# Patient Record
Sex: FEMALE | URBAN - METROPOLITAN AREA
[De-identification: names, ages, dates, MRNs, and addresses within clinical notes are randomized per-mention and may not be internally consistent; named-entity substitution may affect disease eponyms.]

---

## 2022-06-16 ENCOUNTER — OFFICE VISIT (OUTPATIENT)
Dept: ORTHOPEDIC SURGERY | Age: 32
End: 2022-06-16
Payer: COMMERCIAL

## 2022-06-16 VITALS — BODY MASS INDEX: 18.58 KG/M2 | HEIGHT: 62 IN | WEIGHT: 101 LBS

## 2022-06-16 DIAGNOSIS — M75.01 ADHESIVE CAPSULITIS OF RIGHT SHOULDER: ICD-10-CM

## 2022-06-16 DIAGNOSIS — M25.511 RIGHT SHOULDER PAIN, UNSPECIFIED CHRONICITY: Primary | ICD-10-CM

## 2022-06-16 PROCEDURE — 99203 OFFICE O/P NEW LOW 30 MIN: CPT | Performed by: ORTHOPAEDIC SURGERY

## 2022-06-16 PROCEDURE — 20610 DRAIN/INJ JOINT/BURSA W/O US: CPT | Performed by: ORTHOPAEDIC SURGERY

## 2022-06-16 RX ORDER — MELOXICAM 15 MG/1
TABLET ORAL
COMMUNITY
Start: 2022-05-20

## 2022-06-16 RX ORDER — HYOSCYAMINE SULFATE 0.12 MG/1
TABLET SUBLINGUAL
COMMUNITY
Start: 2022-01-13 | End: 2022-11-03

## 2022-06-16 RX ORDER — BUSPIRONE HYDROCHLORIDE 15 MG/1
TABLET ORAL
COMMUNITY
Start: 2022-05-20

## 2022-06-16 RX ORDER — TRIAMCINOLONE ACETONIDE 40 MG/ML
80 INJECTION, SUSPENSION INTRA-ARTICULAR; INTRAMUSCULAR ONCE
Status: COMPLETED | OUTPATIENT
Start: 2022-06-16 | End: 2022-06-16

## 2022-06-16 RX ORDER — DICYCLOMINE HCL 20 MG
20 TABLET ORAL
COMMUNITY
Start: 2022-01-13

## 2022-06-16 RX ORDER — LIDOCAINE HYDROCHLORIDE 10 MG/ML
8 INJECTION, SOLUTION INFILTRATION; PERINEURAL ONCE
Status: COMPLETED | OUTPATIENT
Start: 2022-06-16 | End: 2022-06-16

## 2022-06-16 RX ORDER — BACLOFEN 10 MG/1
TABLET ORAL
COMMUNITY
Start: 2022-05-20

## 2022-06-16 RX ADMIN — LIDOCAINE HYDROCHLORIDE 8 ML: 10 INJECTION, SOLUTION INFILTRATION; PERINEURAL at 17:04

## 2022-06-16 RX ADMIN — TRIAMCINOLONE ACETONIDE 80 MG: 40 INJECTION, SUSPENSION INTRA-ARTICULAR; INTRAMUSCULAR at 17:04

## 2022-06-16 NOTE — PROGRESS NOTES
Chief Complaint    Shoulder Pain (NP RIGHT SHOULDER)      History of Present Illness:  Charla Griffith is a pleasant,  34 y.o. female here today on referral from Dr. Karlee Gagnon for consultation and evaluation of right shoulder pain. 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 She then had a arthroscopic Bankart repair by Dr. Amador Neal when she was 23. Reported she had done well since her arthroscopic surgery but with twinges of pain here or there. She continues to work as a , also breeds large canines. She has 4 young children. With no recent injury or trauma, she started having intense right shoulder pain with a limited range of motion starting January of this year, became very limited in May. She was treated with oral steroids taper followed by scheduled anti-inflammatory followed by physical therapy. Medical History:    No notes on file    Patient's medications, allergies, past medical, surgical, social and family histories were reviewed and updated as appropriate. History reviewed. No pertinent past medical history.    Social History     Socioeconomic History    Marital status: Legally      Spouse name: Not on file    Number of children: Not on file    Years of education: Not on file    Highest education level: Not on file   Occupational History    Not on file   Tobacco Use    Smoking status: Never Smoker    Smokeless tobacco: Never Used   Substance and Sexual Activity    Alcohol use: Never    Drug use: Never    Sexual activity: Not on file   Other Topics Concern    Not on file   Social History Narrative    Not on file     Social Determinants of Health     Financial Resource Strain:     Difficulty of Paying Living Expenses: Not on file   Food Insecurity:     Worried About Running Out of Food in the Last Year: Not on file    Aldo of Food in the Last Year: Not on file   Transportation Needs:  Lack of Transportation (Medical): Not on file    Lack of Transportation (Non-Medical): Not on file   Physical Activity:     Days of Exercise per Week: Not on file    Minutes of Exercise per Session: Not on file   Stress:     Feeling of Stress : Not on file   Social Connections:     Frequency of Communication with Friends and Family: Not on file    Frequency of Social Gatherings with Friends and Family: Not on file    Attends Jain Services: Not on file    Active Member of Clubs or Organizations: Not on file    Attends Club or Organization Meetings: Not on file    Marital Status: Not on file   Intimate Partner Violence:     Fear of Current or Ex-Partner: Not on file    Emotionally Abused: Not on file    Physically Abused: Not on file    Sexually Abused: Not on file   Housing Stability:     Unable to Pay for Housing in the Last Year: Not on file    Number of Places Lived in the Last Year: Not on file    Unstable Housing in the Last Year: Not on file       Allergies   Allergen Reactions    Azithromycin Diarrhea     Current Outpatient Medications on File Prior to Visit   Medication Sig Dispense Refill    dicyclomine (BENTYL) 20 MG tablet Take 20 mg by mouth      Hyoscyamine Sulfate SL 0.125 MG SUBL DISSOLVE 1 TAB BY MOUTH WITH MEALS AND AT NIGHT UP TO FOUR TIMES DAILY      baclofen (LIORESAL) 10 MG tablet TAKE 1 TABLET BY MOUTH TWICE A DAY      busPIRone (BUSPAR) 15 MG tablet TAKE 1 TABLET BY MOUTH TWICE A DAY      meloxicam (MOBIC) 15 MG tablet TAKE 1 TABLET BY MOUTH EVERY DAY       No current facility-administered medications on file prior to visit. Review of Systems  A 14 point review of systems was completed by the patient on 6/16/2022 and is available in the media section of the scanned medical record and was reviewed on 6/16/2022. The review is negative with the exception of those things mentioned in the HPI and Past Medical History    Vital Signs:   There were no vitals filed for this visit. General Exam:   Constitutional: Patient is adequately groomed with no evidence of malnutrition      R Shoulder Examination:    Inspection:  No skin lesions, no obvious deformity, no swelling. Palpation:  Tenderness over scapula and anterior shoulder    Active Range of Motion: Forward Elevation 80, Abduction 50, External Rotation 0, Internal Rotation 0    Passive Range of Motion: Forward Elevation 90, Abduction 90, External Rotation 5, Internal Rotation 5    Strength:  External Rotation 5/5, Internal Rotation 5/5, Champagne Toast 5/5, Supraspinatus 5/5    Special Tests: Lack of normal glenohumeral glide compared to contralateral side    Neurovascular: Palpable radial pulse, normal sensation in the median, ulnar, radial nerve distributions    Radiology:     Plain radiographs of the R shoulder, comprising 3 views: AP, Scapular Y and Axillary lateral were  obtained and reviewed in the office:    Impression: Normal osseous alignment of the glenohumeral joint         Assessment :  Sophie Cardona is a pleasant 34 y.o. female with pain related to right shoulder severe adhesive capsulitis        Impression:  Encounter Diagnoses   Name Primary?  Right shoulder pain, unspecified chronicity Yes    Adhesive capsulitis of right shoulder        Office Procedures:  Orders Placed This Encounter   Procedures    XR SHOULDER RIGHT 1 VW     Standing Status:   Future     Number of Occurrences:   1     Standing Expiration Date:   6/15/2023    External Referral To Physical Therapy     Referral Priority:   Routine     Referral Type:   Eval and Treat     Referral Reason:   Specialty Services Required     Requested Specialty:   Physical Therapist     Number of Visits Requested:   1    NM ARTHROCENTESIS ASPIR&/INJ MAJOR JT/BURSA W/O US       Treatment Plan:  Pertinent imaging was reviewed. The etiology, natural history, and treatment options for the disorder were discussed.   The roles of activity modifications, medications, cryotherapy and heat, injections, physical therapy, and surgical interventions were all described to the patient and questions were answered. Procedure: The indications and risks of steroid injection as well as treatment alternatives were discussed with the patient who consented to the procedure. Under sterile conditions and after informed consent was obtained, using posterior approach the patient was given an injection into the right shoulder glenohumeral joint. 2mL 40 mg of Kenalog  and 4 mL of 0.5% ropivacaine (Naropin) were placed in the shoulder after it was prepped with chlorhexidine. This resulted in good relief of symptoms. There were no complications. The patient was advised to ice the shoulder this evening and to avoid vigorous activities for the next 2 days. They were advised to call us if there was any erythema, enduration, swelling or increasing pain. Detailed physical therapy and instruction provided. Flores Alondraricki will start twice a week physical therapy with daily home exercises to stretch the capsule and cope with this problem. Isha Villalpando will follow up in 4-6 weeks and/or as needed. They were in agreement with this plan and all questions were answered to the patient's satisfaction. They were encouraged to call with any questions. 2:31 PM  6/16/2022    Kinsey Drummond MD  Sports Medicine Fellow  [facility]    The encounter with Isha Villalpando was supervised by Dr. Alf Teresa who personally examined the patient and reviewed the plan. This dictation was performed with a verbal recognition program (DRAGON) and it was checked for errors. It is possible that there are still dictated errors within this office note. If so, please bring any errors to my attention for an addendum.   All efforts were made to ensure that this office note is accurate.   _______________  I was physically present and personally supervised the Orthopaedic Sports Medicine Fellow in the evaluation and development of a treatment plan for this patient. I personally interviewed the patient and performed a physical examination. In addition, I discussed the patient's condition and treatment options with them. I have also reviewed and agree with the past medical, family and social history unless otherwise noted. All of the patient's questions were answered. Rancho Khan MD, PhD  6/16/2022        ______________________

## 2022-06-16 NOTE — LETTER
Shoulder Elbow Rehabilitation Referral    Patient Name: Judith Upton      YOB: 1990    Diagnosis:   1. Right shoulder pain, unspecified chronicity    adhesive capsulitis    Post Op Instructions:  [] Continuous passive motion (CPM)  [] Elbow range of motion  [] Exercise in plane of scapula   []  Strengthening     [] Pulley and instruction    [x] Home exercise program (copy to patient)   [] Sling when arm at risk  [] Sling or brace at all times   [x] AAROM: Forward elevation to 170            [x] AAROM: External rotation to 170    [] Isometric external rotator strengthening [x] AAROM: internal rotation: up the back  [] Isometric abductor strengthening  [x] AAROM: Internal abduction     [] Isometric internal rotator strengthening [] AAROM: cross-body adduction             Stretching:     Strengthening:  [x] Four quadrant (FE, ER, IR, CBA)  [] Rotator cuff (ER, IR, Abd)  [x] Forward Elevation    [] External Rotators     [x] External Rotation    [] Internal Rotators  [x] Internal Rotation: up/back   [] Abductors     [x] Internal Rotation: supine in abduction  [] Flexors  [x] Cross-body abduction    [] Extensors  [x] Pendulum (FE, Abd/Add, cw/ccw)  [x] Scapular Stabilizers   [x] Wall-walking (FE, Abd)    [x] Shoulder shrugs     [x] Table slides      [x] Rhomboid pinch  [x] Elbow (flex, ext, pron, sup)    [] Lat.  Pull downs     [] Medial epicondylitis program    [] Forward punch   [] Lateral epicondylitis program    [] Internal rotators     [] Progressive resistive exercises  [] Bench Press        [] Bench press plus  Activities:     [] Lateral pull-downs  [] Rowing     [] Progressive two-hand supine press  [] Stepper/Exercise bike   [] Biceps: curls/supination  [] Swimming  [] Water exercises    Modalities: PRN    Return to Sport:  [x] Ultrasound     [] Plyometrics  [] Iontophoresis     [] Rhythmic stabilization  [] Moist heat     [] Core strengthening   [x] Massage     [] Sports specific program: [x] Cryotherapy      [x] Electrical stimulation     [x] Paraffin  [x] Whirlpool  [] TENS    [x] Home exercise program (copy to patient). Perform exercises for:   15     minutes    2-3      times/day  [x] Supervised physical therapy  Frequency: []  1x week  [x] 2x week  [] 3x week  [] Other:   Duration: [] 2 weeks   [] 4 weeks  [x] 6 weeks  [] Other:     Additional Instructions:   Adhesive capsulitis. Gentle stretching exercises, manipulations, hands-on, use all modalities        Samer VIJI Petit MD, PhD

## 2022-07-14 ENCOUNTER — OFFICE VISIT (OUTPATIENT)
Dept: ORTHOPEDIC SURGERY | Age: 32
End: 2022-07-14
Payer: COMMERCIAL

## 2022-07-14 VITALS — WEIGHT: 101 LBS | BODY MASS INDEX: 18.58 KG/M2 | HEIGHT: 62 IN

## 2022-07-14 DIAGNOSIS — M25.511 RIGHT SHOULDER PAIN, UNSPECIFIED CHRONICITY: Primary | ICD-10-CM

## 2022-07-14 DIAGNOSIS — M67.911 ROTATOR CUFF DISORDER, RIGHT: ICD-10-CM

## 2022-07-14 PROCEDURE — 99213 OFFICE O/P EST LOW 20 MIN: CPT | Performed by: ORTHOPAEDIC SURGERY

## 2022-07-14 RX ORDER — SERTRALINE HYDROCHLORIDE 100 MG/1
100 TABLET, FILM COATED ORAL NIGHTLY
COMMUNITY
Start: 2022-07-14

## 2022-07-14 NOTE — PROGRESS NOTES
Chief Complaint    Shoulder Pain (CK Rt Shoulder)      History of Present Illness:  Gabi Jose is a pleasant, 34 y.o., female, here today for follow up of right shoulder. She reports right shoulder pain since few months ago . She does not remember any specific injury to her shoulder. She has history of right shoulder arthrosocpy and labral repair several years ago. Last visit she was diagnosed with adhesive capsultiitis and she has givin a steroid injection. She states that her shoulder motion getting better after the injection. However, she still have pain that affecting her work and daily activites. She has continued in physical therapy as instructed. She reports no new injuries or setbacks. History of Present Illness from 6/16/2022:  Gabi Jose is a pleasant,  34 y.o. female here today on referral from Dr. Crystal Wallace for consultation and evaluation of right shoulder pain. 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 She then had a arthroscopic Bankart repair by Dr. Mary Marlow when she was 23. Reported she had done well since her arthroscopic surgery but with twinges of pain here or there. She continues to work as a , also breeds large canines. She has 4 young children. With no recent injury or trauma, she started having intense right shoulder pain with a limited range of motion starting January of this year, became very limited in May. She was treated with oral steroids taper followed by scheduled anti-inflammatory followed by physical therapy. Medical History:  Patient's medications, allergies, past medical, surgical, social and family histories were reviewed and updated as appropriate. No notes on file    Review of Systems  A 14 point review of systems was completed by the patient on  and is available in the media section of the scanned medical record and was reviewed on 7/14/2022.   The review is negative with the exception of those things mentioned in the HPI and Past Medical History    Vital Signs: There were no vitals filed for this visit. General/Appearance: Alert and oriented and in no apparent distress. Skin:  There are no skin lesions, cellulitis, or extreme edema. The patient has warm and well-perfused Bilateral upper extremities with brisk capillary refill. Inspection:  No skin lesions, no obvious deformity, no swelling. Palpation:  Tenderness over rotator cuff footprint     Active Range of Motion: Forward Elevation 150, Abduction 150 External Rotation 40, Internal Rotation L1     Passive Range of Motion: Same as active     Strength:  External Rotation 5/5, Internal Rotation 5/5, Champagne Toast 4/5 with pain, Supraspinatus /5     Special Tests:  Positive impingement signs     Neurovascular: Palpable radial pulse, normal sensation in the median, ulnar, radial nerve distributions      Radiology:     No new XR obtained at this time. Assessment :  Ms. Humera Sousa is a pleasant, 34 y.o. patient who is recovering from adhesive capsulitis and she has rotator cuff tenderness and weakness. We will obtain an MRI to rule out a rotator cuff tear      Impression:  Encounter Diagnoses   Name Primary? Right shoulder pain, unspecified chronicity Yes    Rotator cuff disorder, right        Office Procedures:  Orders Placed This Encounter   Procedures    MRI SHOULDER RIGHT WO CONTRAST     Standing Status:   Future     Standing Expiration Date:   7/14/2023     Order Specific Question:   Reason for exam:     Answer:   Moncho Weaver       Treatment Plan: Patient has a previous diagnosis of adhesive capsulitis on her last visit and she had a steroid injection and physiotherapy. She presented today to our clinic with significant improvement in her shoulder range of motion.   However, she still have shoulder pain and her clinical examination shows positive impingement signs and rotator cuff muscle weakness. We do think she may have an underlying rotator cuff tear that cause for her secondary adhesive capsulitis. We would like to have an MRI on her shoulder to rule out rotator cuff tear or any other internal derangement of her shoulder. Would like to see her back after the MRI. All questions were answered to patient's satisfaction and She was encouraged to call with any further questions or concerns. Morgan Cheung is in agreement with this plan. 7/14/2022  11:18 AM    Danny Vasquez MD  Clinical Fellow at [place] [address]    During this examination, Dameon Garay MD functioned as a scribe for Dr. Margarita Rodrigues    This dictation was performed with a verbal recognition program (DRAGON) and it was checked for errors. It is possible that there are still dictated errors within this office note. If so, please bring any errors to my attention for an addendum. All efforts were made to ensure that this office note is accurate.  ______________  I was physically present and personally supervised the Orthopaedic Sports Medicine Fellow in the evaluation and development of a treatment plan for this patient. I personally interviewed the patient and performed a physical examination. In addition, I discussed the patient's condition and treatment options with them. I have also reviewed and agree with the past medical, family and social history unless otherwise noted. All of the patient's questions were answered. Rancho Ivory MD, PhD  7/14/2022

## 2022-08-18 ENCOUNTER — OFFICE VISIT (OUTPATIENT)
Dept: ORTHOPEDIC SURGERY | Age: 32
End: 2022-08-18
Payer: COMMERCIAL

## 2022-08-18 DIAGNOSIS — M25.511 RIGHT SHOULDER PAIN, UNSPECIFIED CHRONICITY: Primary | ICD-10-CM

## 2022-08-18 DIAGNOSIS — M75.21 BICEPS TENDINITIS ON RIGHT: ICD-10-CM

## 2022-08-18 DIAGNOSIS — M67.911 ROTATOR CUFF DISORDER, RIGHT: Primary | ICD-10-CM

## 2022-08-18 PROCEDURE — L3670 SO ACRO/CLAV CAN WEB PRE OTS: HCPCS | Performed by: ORTHOPAEDIC SURGERY

## 2022-08-18 PROCEDURE — 99213 OFFICE O/P EST LOW 20 MIN: CPT | Performed by: ORTHOPAEDIC SURGERY

## 2022-08-18 NOTE — PROGRESS NOTES
Chief Complaint    Shoulder Pain (Test results - rt shoulder)      History of Present Illness:  Hilda Flowers is a pleasant, 34 y.o., female, here today for follow up of her right shoulder and imaging results. We have been treating this pain conservatively for pain related to adhesive caspulitis. We did administer a steroid injection for this problem. Her motion did improve with conservative treatment, however she continues to experience  persistent pain in the shoulder. This greatly impacts her quality of life. She is having difficulty lifting her toddler in and out of the car seat. She reports no new injuries or setbacks. Pain Assessment  Location of Pain: Shoulder  Location Modifiers: Right  Severity of Pain: 7  Quality of Pain: Dull, Aching  Duration of Pain: Other (Comment)  Frequency of Pain: Intermittent      Medical History:  Patient's medications, allergies, past medical, surgical, social and family histories were reviewed and updated as appropriate. Review of Systems  A 14 point review of systems was completed by the patient and is available in the media section of the scanned medical record and was reviewed on 8/18/2022. The review is negative with the exception of those things mentioned in the HPI and Past Medical History    Vital Signs: There were no vitals filed for this visit. General/Appearance: Alert and oriented and in no apparent distress. Skin:  There are no skin lesions, cellulitis, or extreme edema. The patient has warm and well-perfused Bilateral upper extremities with brisk capillary refill. Right Shoulder Exam:  Inspection: No gross deformities, no signs of infection. Palpation: Tenderness over bicipital groove    Active Range of Motion:   Forward Elevation 150, Abduction 150, External Rotation 40, Internal Rotation L1    Passive Range of Motion: Cross arm adduction elicits anterior shoulder pain    Strength:  External Rotation 5/5, Internal Rotation 5/5    Special Tests: No Chava muscle deformity. Neurovascular: Sensation to light touch is intact, no motor deficits, palpable radial pulses 2+      Radiology:     No new XR obtained at this time. MRI Right Shoulder: 8/8/22    CONCLUSION:   1. Mild tendinosis and low-grade humeral and articular sided fraying in the distal superior and    mid subscapularis tendon fibers at the lesser tuberosity footprint. 2. Intact long head biceps tendon. 3. Intact labrum. 4. Preserved glenohumeral cartilage. 5. No acromioclavicular joint hypertrophy. Assessment :  Ms. Svitlana Smyth is a pleasant, 34 y.o. patient with a right shoulder, partial cuff tear as well as clinically she has biceps instability. However, this was not clearly defined on the MRI. Impression:  Encounter Diagnosis   Name Primary? Rotator cuff disorder, right Yes       Office Procedures:  No orders of the defined types were placed in this encounter. Treatment Plan: We reviewed pertinent imaging today with Svitlana Smyth. We discussed diagnosis and treatment options in detail. We discussed conservative vs surgical treatment options moving forward. Surgically we could proceed with a diagnostic scope at which time the biceps tendon would likely be re-attached. Conservatively may proceed with a repeat steroid injection, oral NSAIDs and activity modification coupled with continued formal physical therapy. After discussing these options, she would like to go ahead and proceed with an operation. We discussed in detail risks, benefits and expectations postoperatively. We also discussed a recovery timeline following this operation. We will provide surgical dates for the patient today and she may schedule this at her convenience. We will go ahead and have her meet with DME to obtain a sling to wear postoperatively. We will see [facility] for surgery and/or as needed.  All questions were answered to patient's satisfaction and She was encouraged to call with any further questions or concerns. Olaf Saleh is in agreement with this plan. Risks, benefits and potential complications of arthroscopic shoulder surgery were discussed with the patient. Risks discussed include but are not limited to bleeding, infection, anesthetic risk, injury to nerves and blood vessels, deep vein thrombosis, residual stiffness and weakness, and the need for revision surgery. The patient also understands that anesthetic risks include cardiopulmonary issues, drug reactions and even death. The patient voices an understanding of the importance of physical therapy and home exercises after surgery. All questions were answered and written informed consent for surgery was obtained today. The patient was counseled at length about the risks of micki Covid-19 during their perioperative period and any recovery window from their procedure. The patient was made aware that micki Covid-19  may worsen their prognosis for recovering from their procedure  and lend to a higher morbidity and/or mortality risk. All material risks, benefits, and reasonable alternatives including postponing the procedure were discussed. The patient does wish to proceed with the procedure at this time. 8/18/2022  4:24 PM    Rebecca Frost ATC  [facility] [address] [facility]    During this examination, I, Clair Bamberger, functioned as a scribe for Dr. Stacey Mantilla. The history taking and physical examination were performed by Dr. Lyubov Hanson. All counseling during the appointment was performed between the patient and Dr. Lyubov Hanson. 8/18/22  ______________  I, Dr. Stacey Mantilla, personally performed the services described in this documentation as described by Rebecca Frost ATC in my presence, and it is both accurate and complete. Rancho Hanson MD, PhD  8/18/2022

## 2022-09-26 ENCOUNTER — TELEPHONE (OUTPATIENT)
Dept: ORTHOPEDIC SURGERY | Age: 32
End: 2022-09-26

## 2022-09-26 NOTE — TELEPHONE ENCOUNTER
General Question     Subject: FITTED FOR A SLING  Patient and /or Facility Request: Colby Salomon  Contact Number: [phone]    PATIENT CALLED IN TO SEE IF SHE CAN GET FITTED FOR A SLING. Paulette Mcdowell PATIENT IS HAVING SX ON NOV 7. 2022. . AND WOULD LIKE TO COME IN BEFORE HER SX FOR CAN ORDER ONE. .. PLEASE CALL PATIENT BACK AT THE ABOVE NUMBER. ..

## 2022-10-14 ENCOUNTER — TELEPHONE (OUTPATIENT)
Dept: ORTHOPEDIC SURGERY | Age: 32
End: 2022-10-14

## 2022-10-14 NOTE — TELEPHONE ENCOUNTER
LM for the patient regarding routing the completed paperwork to [facility] office for .

## 2022-10-14 NOTE — TELEPHONE ENCOUNTER
Completed both the [facility] form and the FMLA form. Need to contact the patient regarding a fax # for FMLA or to see if she would like to  at one of our locations.

## 2022-10-19 ENCOUNTER — TELEPHONE (OUTPATIENT)
Dept: ORTHOPEDIC SURGERY | Age: 32
End: 2022-10-19

## 2022-10-25 NOTE — TELEPHONE ENCOUNTER
Tali Aarti [id number]    Date: 11/07/22  Type of SX:  Outpatient  Location: [facility]  CPT: 44787   DX Code: I30.005, M75.21  SX area: Rt shoulder  Insurance: [employer]  CPT: 16172  DX: M67.911, M75.21  SX area: Rt shoulder

## 2022-11-03 RX ORDER — VALACYCLOVIR HYDROCHLORIDE 500 MG/1
TABLET, FILM COATED ORAL
COMMUNITY
Start: 2022-10-06 | End: 2022-11-29

## 2022-11-03 NOTE — PROGRESS NOTES
[facility] PRE-SURGICAL TESTING INSTRUCTIONS                      PRIOR TO PROCEDURE DATE:    1. PLEASE FOLLOW ANY INSTRUCTIONS GIVEN TO YOU PER YOUR SURGEON. 2. Arrange for someone to drive you home and be with you for the first 24 hours after discharge for your safety after your procedure for which you received sedation. Ensure it is someone we can share information with regarding your discharge. NOTE: At this time ONLY 2 ADULTS may accompany you   One person MUST stay at hospital entire time if outpatient surgery      3. You must contact your surgeon for instructions IF:  You are taking any blood thinners, aspirin, anti-inflammatory or vitamins. There is a change in your physical condition such as a cold, fever, rash, cuts, sores, or any other infection, especially near your surgical site. 4. Do not drink alcohol the day before or day of your procedure. Do not use any recreational marijuana at least 24 hours or street drugs (heroin, cocaine) at minimum 5 days prior to your procedure. 5. A Pre-Surgical History and Physical MUST be completed WITHIN 30 DAYS OR LESS prior to your procedure. by your Physician or an Urgent Care        THE DAY OF YOUR PROCEDURE:  1. Follow instructions for ARRIVAL TIME as DIRECTED BY YOUR SURGEON. 2. Enter the MAIN entrance from [address] and follow the signs to the free Parking [employer] or [employer] (offered free of charge 7 am-5pm). 3. Enter the Main Entrance of the hospital (do not enter from the lower level of the parking garage). Upon entrance, check in with the  at the surgical information desk on your LEFT. Bring your insurance card and photo ID to register      4. DO NOT EAT ANYTHING 8 hours prior to arrival for surgery. You may have up to 8 ounces of water 4 hours prior to your arrival for surgery.    NOTE: ALL Gastric, Bariatric & Bowel surgery patients - you MUST follow your surgeon's instructions regarding eating/ drinking as you will have very specific instructions to follow. If you did not receive these, call your surgeon's office immediately. 5. MEDICATIONS:  Take the following medications with a SMALL sip of water: NONE  Use your usual dose of inhalers the morning of surgery. BRING your rescue inhaler with you to hospital.   Anesthesia does NOT want you to take insulin the morning of surgery. They will control your blood sugar while you are at the hospital. Please contact your ordering physician for instructions regarding your insulin the night before your procedure. If you have an insulin pump, please keep it set on basal rate. Bariatric patient's call your surgeon if on diabetic medications as some may need to be stopped 1 week prior to surgery    6. Do not swallow additional water when brushing teeth. No gum, candy, mints, or ice chips. Refrain from smoking or at least decrease the amount on day of surgery. 7. Morning of surgery:   Take a shower with an antibacterial soap (i.e., Safeguard or Dial) OR your physician may have instructed you to use Hibiclens. Dress in loose, comfortable clothing appropriate for redressing after your procedure. Do not wear jewelry (including body piercings), make-up (especially NO eye make-up), fingernail polish (NO toenail polish if foot/leg surgery), lotion, powders, or metal hairclips. Do not shave or wax for 72 hours prior to procedure near your operative site. Shaving with a razor can irritate your skin and make it easier to develop an infection. On the day of your procedure, any hair that needs to be removed near the surgical site will be 'clipped' by a healthcare worker using a special clipper designed to avoid skin irritation. 8. Dentures, glasses, or contacts will need to be removed before your procedure. Bring cases for your glasses, contacts, dentures, or hearing aids to protect them while you are in surgery.       9. If you use a CPAP, please bring it with you on the day of your procedure. 10. We recommend that valuable personal belongings such as cash, cell phones, e-tablets, or jewelry, be left at home during your stay. The hospital will not be responsible for valuables that are not secured in the hospital safe. However, if your insurance requires a co-pay, you may want to bring a method of payment, i.e., Check or credit card, if you wish to pay your co-pay the day of surgery. 11. If you are to stay overnight, you may bring a bag with personal items. Please have any large items you may need brought in by your family after your arrival to your hospital room. 12. If you have a Living Will or Durable Power of , please bring a copy on the day of your procedure. How we keep you safe and work to prevent surgical site infections:   1. Health care workers should always check your ID bracelet to verify your name and birth date. You will be asked many times to state your name, date of birth, and allergies. 2. Health care workers should always clean their hands with soap or alcohol gel before providing care to you. It is okay to ask anyone if they cleaned their hands before they touch you. 3. You will be actively involved in verifying the type of procedure you are having and ensuring the correct surgical site. This will be confirmed multiple times prior to your procedure. Do NOT zach your surgery site UNLESS instructed to by your surgeon. 4. When you are in the operating room, your surgical site will be cleansed with a special soap, and in most cases, you will be given an antibiotic before the surgery begins. What to expect AFTER your procedure? 1. Immediately following your procedure, your will be taken to the PACU for the first phase of your recovery. Your nurse will help you recover from any potential side effects of anesthesia, such as extreme drowsiness, changes in your vital signs or breathing patterns.  Nausea, headache, muscle aches, or sore throat may also occur after anesthesia. Your nurse will help you manage these potential side effects. 2. For comfort and safety, arrange to have someone at home with you for the first 24 hours after discharge. 3. You and your family will be given written instructions about your diet, activity, dressing care, medications, and return visits. 4. Once at home, should issues with nausea, pain, or bleeding occur, or should you notice any signs of infection, you should call your surgeon. 5. Always clean your hands before and after caring for your wound. Do not let your family touch your surgery site without cleaning their hands. 6. Narcotic pain medications can cause significant constipation. You may want to add a stool softener to your postoperative medication schedule or speak to your surgeon on how best to manage this SIDE EFFECT. Thank you for allowing us to care for you. We strive to exceed your expectations in the delivery of care and service provided to you and your family. If you need to contact the [place] [address] staff for any reason, please call us at [phone]    Instructions reviewed with patient during preadmission testing phone interview.   Avril Bernal RN.11/3/2022 .2:38 PM      ADDITIONAL EDUCATIONAL INFORMATION REVIEWED PER PHONE WITH YOU AND/OR YOUR FAMILY:  No Hibiclens® Bathing Instructions   Yes Antibacterial Soap

## 2022-11-03 NOTE — PROGRESS NOTES
Place patient label inside box (if no patient label, complete below)  Name:  :  MR#:   Jose Chance / PROCEDURE  I (we) Leonardo Sepulveda (Patient Name) authorize DR. Cullen Loyd (Provider / Aliza Serve) and/or such assistants as may be selected by him/her, to perform the following operation/procedure(s): RIGHT SHOULDER ARTHROSCOPIC BICEPS TENODESIS       Note: If unable to obtain consent prior to an emergent procedure, document the emergent reason in the medical record. This procedure has been explained to my (our) satisfaction and included in the explanation was: The intended benefit, nature, and extent of the procedure to be performed; The significant risks involved and the probability of success; Alternative procedures and methods of treatment; The dangers and probable consequences of such alternatives (including no procedure or treatment); The expected consequences of the procedure on my future health; Whether other qualified individuals would be performing important surgical tasks and/or whether  would be present to advise or support the procedure. I (we) understand that there are other risks of infection and other serious complications in the pre-operative/procedural and postoperative/procedural stages of my (our) care. I (we) have asked all of the questions which I (we) thought were important in deciding whether or not to undergo treatment or diagnosis. These questions have been answered to my (our) satisfaction. I (we) understand that no assurance can be given that the procedure will be a success, and no guarantee or warranty of success has been given to me (us). It has been explained to me (us) that during the course of the operation/procedure, unforeseen conditions may be revealed that necessitate extension of the original procedure(s) or different procedure(s) than those set forth in Paragraph 1.  I (we) authorize and request that the above-named physician, his/her assistants or his/her designees, perform procedures as necessary and desirable if deemed to be in my (our) best interest.     Revised 8/2/2021                                                                          Page 1 of 2       I acknowledge that health care personnel may be observing this procedure for the purpose of medical education or other specified purposes as may be necessary as requested and/or approved by my (our) physician. I (we) consent to the disposal by the hospital Pathologist of the removed tissue, parts or organs in accordance with hospital policy. I do ____ do not ____ consent to the use of a local infiltration pain blocking agent that will be used by my provider/surgical provider to help alleviate pain during my procedure. I do ____ do not ____ consent to an emergent blood transfusion in the case of a life-threatening situation that requires blood components to be administered. This consent is valid for 24 hours from the beginning of the procedure. This patient does ____ or does not ____ currently have a DNR status/order. If DNR order is in place, obtain Addendum to the Surgical Consent for ALL Patients with a DNR Order to address tawnya-operative status for limited intervention or DNR suspension.      I have read and fully understand the above Consent for Operation/Procedure and that all blanks were completed before I signed the consent.   _____________________________       _____________________      ____/____am/pm  Signature of Patient or legal representative      Printed Name / Relationship            Date / Time   ____________________________       _____________________      ____/____am/pm  Witness to Signature                                    Printed Name                    Date / Time    If patient is unable to sign or is a minor, complete the following)  Patient is a minor, ____ years of age, or unable to sign because: ______________________________________________________________________________________________    If a phone consent is obtained, consent will be documented by using two health care professionals, each affirming that the consenting party has no questions and gives consent for the procedure discussed with the physician/provider.   _____________________          ____________________       _____/_____am/pm   2nd witness to phone consent        Printed name           Date / Time    Informed Consent:  I have provided the explanation described above in section 1 to the patient and/or legal representative.  I have provided the patient and/or legal representative with an opportunity to ask any questions about the proposed operation/procedure.   ___________________________          ____________________         ____/____am/pm  Provider / Proceduralist                            Printed name            Date / Time  Revised 8/2/2021                                                                      Page 2 of 2

## 2022-11-07 ENCOUNTER — ANESTHESIA EVENT (OUTPATIENT)
Dept: OPERATING ROOM | Age: 32
End: 2022-11-07
Payer: COMMERCIAL

## 2022-11-07 ENCOUNTER — HOSPITAL ENCOUNTER (OUTPATIENT)
Age: 32
Setting detail: OUTPATIENT SURGERY
Discharge: HOME | End: 2022-11-07
Attending: ORTHOPAEDIC SURGERY | Admitting: ORTHOPAEDIC SURGERY
Payer: COMMERCIAL

## 2022-11-07 ENCOUNTER — ANESTHESIA (OUTPATIENT)
Dept: OPERATING ROOM | Age: 32
End: 2022-11-07
Payer: COMMERCIAL

## 2022-11-07 VITALS
WEIGHT: 100 LBS | HEART RATE: 60 BPM | RESPIRATION RATE: 17 BRPM | HEIGHT: 62 IN | OXYGEN SATURATION: 100 % | TEMPERATURE: 98.9 F | DIASTOLIC BLOOD PRESSURE: 82 MMHG | SYSTOLIC BLOOD PRESSURE: 118 MMHG | BODY MASS INDEX: 18.4 KG/M2

## 2022-11-07 DIAGNOSIS — M67.813 BICEPS TENDINOSIS OF RIGHT SHOULDER: Primary | ICD-10-CM

## 2022-11-07 LAB — PREGNANCY, URINE: NEGATIVE

## 2022-11-07 PROCEDURE — 6360000002 HC RX W HCPCS: Performed by: ANESTHESIOLOGY

## 2022-11-07 PROCEDURE — 2580000003 HC RX 258: Performed by: ORTHOPAEDIC SURGERY

## 2022-11-07 PROCEDURE — 3600000014 HC SURGERY LEVEL 4 ADDTL 15MIN: Performed by: ORTHOPAEDIC SURGERY

## 2022-11-07 PROCEDURE — 6360000002 HC RX W HCPCS: Performed by: ORTHOPAEDIC SURGERY

## 2022-11-07 PROCEDURE — 64415 NJX AA&/STRD BRCH PLXS IMG: CPT | Performed by: ANESTHESIOLOGY

## 2022-11-07 PROCEDURE — 2500000003 HC RX 250 WO HCPCS: Performed by: NURSE ANESTHETIST, CERTIFIED REGISTERED

## 2022-11-07 PROCEDURE — C9290 INJ, BUPIVACAINE LIPOSOME: HCPCS | Performed by: ANESTHESIOLOGY

## 2022-11-07 PROCEDURE — 3700000000 HC ANESTHESIA ATTENDED CARE: Performed by: ORTHOPAEDIC SURGERY

## 2022-11-07 PROCEDURE — 6360000002 HC RX W HCPCS

## 2022-11-07 PROCEDURE — 84703 CHORIONIC GONADOTROPIN ASSAY: CPT

## 2022-11-07 PROCEDURE — 7100000001 HC PACU RECOVERY - ADDTL 15 MIN: Performed by: ORTHOPAEDIC SURGERY

## 2022-11-07 PROCEDURE — 7100000010 HC PHASE II RECOVERY - FIRST 15 MIN: Performed by: ORTHOPAEDIC SURGERY

## 2022-11-07 PROCEDURE — 7100000000 HC PACU RECOVERY - FIRST 15 MIN: Performed by: ORTHOPAEDIC SURGERY

## 2022-11-07 PROCEDURE — 2580000003 HC RX 258: Performed by: ANESTHESIOLOGY

## 2022-11-07 PROCEDURE — 2500000003 HC RX 250 WO HCPCS

## 2022-11-07 PROCEDURE — 7100000011 HC PHASE II RECOVERY - ADDTL 15 MIN: Performed by: ORTHOPAEDIC SURGERY

## 2022-11-07 PROCEDURE — 2709999900 HC NON-CHARGEABLE SUPPLY: Performed by: ORTHOPAEDIC SURGERY

## 2022-11-07 PROCEDURE — 2500000003 HC RX 250 WO HCPCS: Performed by: ANESTHESIOLOGY

## 2022-11-07 PROCEDURE — C1713 ANCHOR/SCREW BN/BN,TIS/BN: HCPCS | Performed by: ORTHOPAEDIC SURGERY

## 2022-11-07 PROCEDURE — 2720000010 HC SURG SUPPLY STERILE: Performed by: ORTHOPAEDIC SURGERY

## 2022-11-07 PROCEDURE — 3700000001 HC ADD 15 MINUTES (ANESTHESIA): Performed by: ORTHOPAEDIC SURGERY

## 2022-11-07 PROCEDURE — 3600000004 HC SURGERY LEVEL 4 BASE: Performed by: ORTHOPAEDIC SURGERY

## 2022-11-07 DEVICE — SCREW INTRF L15MM DIA4.75MM W/ SZ 2 FIBERWIRE SUT AND DISP: Type: IMPLANTABLE DEVICE | Site: SHOULDER | Status: FUNCTIONAL

## 2022-11-07 DEVICE — ANCHOR SUT L12MM DIA2.4MM BIOCOMPOSITE W/ NO2 FIBERWIRE: Type: IMPLANTABLE DEVICE | Site: SHOULDER | Status: FUNCTIONAL

## 2022-11-07 RX ORDER — OXYCODONE HYDROCHLORIDE 5 MG/1
10 TABLET ORAL PRN
Status: DISCONTINUED | OUTPATIENT
Start: 2022-11-07 | End: 2022-11-07 | Stop reason: HOSPADM

## 2022-11-07 RX ORDER — SODIUM CHLORIDE 0.9 % (FLUSH) 0.9 %
5-40 SYRINGE (ML) INJECTION PRN
Status: DISCONTINUED | OUTPATIENT
Start: 2022-11-07 | End: 2022-11-07 | Stop reason: HOSPADM

## 2022-11-07 RX ORDER — ROCURONIUM BROMIDE 10 MG/ML
INJECTION, SOLUTION INTRAVENOUS PRN
Status: DISCONTINUED | OUTPATIENT
Start: 2022-11-07 | End: 2022-11-07 | Stop reason: SDUPTHER

## 2022-11-07 RX ORDER — LABETALOL HYDROCHLORIDE 5 MG/ML
10 INJECTION, SOLUTION INTRAVENOUS
Status: DISCONTINUED | OUTPATIENT
Start: 2022-11-07 | End: 2022-11-07 | Stop reason: HOSPADM

## 2022-11-07 RX ORDER — ONDANSETRON 2 MG/ML
4 INJECTION INTRAMUSCULAR; INTRAVENOUS
Status: DISCONTINUED | OUTPATIENT
Start: 2022-11-07 | End: 2022-11-07 | Stop reason: HOSPADM

## 2022-11-07 RX ORDER — FENTANYL CITRATE 50 UG/ML
INJECTION, SOLUTION INTRAMUSCULAR; INTRAVENOUS PRN
Status: DISCONTINUED | OUTPATIENT
Start: 2022-11-07 | End: 2022-11-07 | Stop reason: SDUPTHER

## 2022-11-07 RX ORDER — GLYCOPYRROLATE 0.2 MG/ML
INJECTION INTRAMUSCULAR; INTRAVENOUS PRN
Status: DISCONTINUED | OUTPATIENT
Start: 2022-11-07 | End: 2022-11-07 | Stop reason: SDUPTHER

## 2022-11-07 RX ORDER — KETAMINE HCL IN NACL, ISO-OSM 20 MG/2 ML
SYRINGE (ML) INJECTION PRN
Status: DISCONTINUED | OUTPATIENT
Start: 2022-11-07 | End: 2022-11-07 | Stop reason: SDUPTHER

## 2022-11-07 RX ORDER — OXYCODONE HYDROCHLORIDE AND ACETAMINOPHEN 5; 325 MG/1; MG/1
1 TABLET ORAL EVERY 6 HOURS PRN
Qty: 28 TABLET | Refills: 0 | Status: SHIPPED | OUTPATIENT
Start: 2022-11-07 | End: 2022-11-14

## 2022-11-07 RX ORDER — OXYCODONE HYDROCHLORIDE 5 MG/1
5 TABLET ORAL PRN
Status: DISCONTINUED | OUTPATIENT
Start: 2022-11-07 | End: 2022-11-07 | Stop reason: HOSPADM

## 2022-11-07 RX ORDER — LIDOCAINE HYDROCHLORIDE 10 MG/ML
1 INJECTION, SOLUTION EPIDURAL; INFILTRATION; INTRACAUDAL; PERINEURAL
Status: DISCONTINUED | OUTPATIENT
Start: 2022-11-07 | End: 2022-11-07 | Stop reason: HOSPADM

## 2022-11-07 RX ORDER — SODIUM CHLORIDE 9 MG/ML
INJECTION, SOLUTION INTRAVENOUS PRN
Status: DISCONTINUED | OUTPATIENT
Start: 2022-11-07 | End: 2022-11-07 | Stop reason: HOSPADM

## 2022-11-07 RX ORDER — MIDAZOLAM HYDROCHLORIDE 1 MG/ML
INJECTION INTRAMUSCULAR; INTRAVENOUS
Status: COMPLETED
Start: 2022-11-07 | End: 2022-11-07

## 2022-11-07 RX ORDER — DEXAMETHASONE SODIUM PHOSPHATE 4 MG/ML
INJECTION, SOLUTION INTRA-ARTICULAR; INTRALESIONAL; INTRAMUSCULAR; INTRAVENOUS; SOFT TISSUE PRN
Status: DISCONTINUED | OUTPATIENT
Start: 2022-11-07 | End: 2022-11-07 | Stop reason: SDUPTHER

## 2022-11-07 RX ORDER — HYDRALAZINE HYDROCHLORIDE 20 MG/ML
10 INJECTION INTRAMUSCULAR; INTRAVENOUS
Status: DISCONTINUED | OUTPATIENT
Start: 2022-11-07 | End: 2022-11-07 | Stop reason: HOSPADM

## 2022-11-07 RX ORDER — FENTANYL CITRATE 50 UG/ML
25 INJECTION, SOLUTION INTRAMUSCULAR; INTRAVENOUS EVERY 5 MIN PRN
Status: DISCONTINUED | OUTPATIENT
Start: 2022-11-07 | End: 2022-11-07 | Stop reason: HOSPADM

## 2022-11-07 RX ORDER — ONDANSETRON 2 MG/ML
INJECTION INTRAMUSCULAR; INTRAVENOUS PRN
Status: DISCONTINUED | OUTPATIENT
Start: 2022-11-07 | End: 2022-11-07 | Stop reason: SDUPTHER

## 2022-11-07 RX ORDER — MIDAZOLAM HYDROCHLORIDE 1 MG/ML
INJECTION INTRAMUSCULAR; INTRAVENOUS PRN
Status: DISCONTINUED | OUTPATIENT
Start: 2022-11-07 | End: 2022-11-07 | Stop reason: SDUPTHER

## 2022-11-07 RX ORDER — BUPIVACAINE HYDROCHLORIDE 2.5 MG/ML
INJECTION, SOLUTION INFILTRATION; PERINEURAL
Status: DISCONTINUED | OUTPATIENT
Start: 2022-11-07 | End: 2022-11-07

## 2022-11-07 RX ORDER — SODIUM CHLORIDE, SODIUM LACTATE, POTASSIUM CHLORIDE, CALCIUM CHLORIDE 600; 310; 30; 20 MG/100ML; MG/100ML; MG/100ML; MG/100ML
INJECTION, SOLUTION INTRAVENOUS CONTINUOUS
Status: DISCONTINUED | OUTPATIENT
Start: 2022-11-07 | End: 2022-11-07 | Stop reason: HOSPADM

## 2022-11-07 RX ORDER — PROPOFOL 10 MG/ML
INJECTION, EMULSION INTRAVENOUS PRN
Status: DISCONTINUED | OUTPATIENT
Start: 2022-11-07 | End: 2022-11-07 | Stop reason: SDUPTHER

## 2022-11-07 RX ORDER — BUPIVACAINE HYDROCHLORIDE 5 MG/ML
INJECTION, SOLUTION EPIDURAL; INTRACAUDAL
Status: COMPLETED
Start: 2022-11-07 | End: 2022-11-07

## 2022-11-07 RX ORDER — FENTANYL CITRATE 50 UG/ML
INJECTION, SOLUTION INTRAMUSCULAR; INTRAVENOUS
Status: COMPLETED
Start: 2022-11-07 | End: 2022-11-07

## 2022-11-07 RX ORDER — SENNA PLUS 8.6 MG/1
1 TABLET ORAL 2 TIMES DAILY
Qty: 60 TABLET | Refills: 11 | Status: SHIPPED | OUTPATIENT
Start: 2022-11-07 | End: 2022-11-29

## 2022-11-07 RX ORDER — SODIUM CHLORIDE 0.9 % (FLUSH) 0.9 %
5-40 SYRINGE (ML) INJECTION EVERY 12 HOURS SCHEDULED
Status: DISCONTINUED | OUTPATIENT
Start: 2022-11-07 | End: 2022-11-07 | Stop reason: HOSPADM

## 2022-11-07 RX ORDER — LIDOCAINE HYDROCHLORIDE 20 MG/ML
INJECTION, SOLUTION INTRAVENOUS PRN
Status: DISCONTINUED | OUTPATIENT
Start: 2022-11-07 | End: 2022-11-07 | Stop reason: SDUPTHER

## 2022-11-07 RX ORDER — ONDANSETRON 4 MG/1
4 TABLET, FILM COATED ORAL DAILY PRN
Qty: 30 TABLET | Refills: 0 | Status: SHIPPED | OUTPATIENT
Start: 2022-11-07 | End: 2022-11-29

## 2022-11-07 RX ORDER — BUPIVACAINE HYDROCHLORIDE 5 MG/ML
INJECTION, SOLUTION EPIDURAL; INTRACAUDAL PRN
Status: DISCONTINUED | OUTPATIENT
Start: 2022-11-07 | End: 2022-11-07 | Stop reason: SDUPTHER

## 2022-11-07 RX ADMIN — FENTANYL CITRATE 100 MCG: 50 INJECTION, SOLUTION INTRAMUSCULAR; INTRAVENOUS at 12:30

## 2022-11-07 RX ADMIN — MIDAZOLAM HYDROCHLORIDE 2 MG: 2 INJECTION, SOLUTION INTRAMUSCULAR; INTRAVENOUS at 12:30

## 2022-11-07 RX ADMIN — LIDOCAINE HYDROCHLORIDE 100 MG: 20 INJECTION, SOLUTION INTRAVENOUS at 13:24

## 2022-11-07 RX ADMIN — ONDANSETRON 4 MG: 2 INJECTION INTRAMUSCULAR; INTRAVENOUS at 13:15

## 2022-11-07 RX ADMIN — SODIUM CHLORIDE, POTASSIUM CHLORIDE, SODIUM LACTATE AND CALCIUM CHLORIDE: 600; 310; 30; 20 INJECTION, SOLUTION INTRAVENOUS at 11:05

## 2022-11-07 RX ADMIN — DEXAMETHASONE SODIUM PHOSPHATE 4 MG: 4 INJECTION, SOLUTION INTRAMUSCULAR; INTRAVENOUS at 13:15

## 2022-11-07 RX ADMIN — PHENYLEPHRINE HYDROCHLORIDE 100 MCG: 10 INJECTION, SOLUTION INTRAMUSCULAR; INTRAVENOUS; SUBCUTANEOUS at 14:55

## 2022-11-07 RX ADMIN — SUGAMMADEX 100 MG: 100 INJECTION, SOLUTION INTRAVENOUS at 15:28

## 2022-11-07 RX ADMIN — BUPIVACAINE HYDROCHLORIDE 20 ML: 5 INJECTION, SOLUTION EPIDURAL; INTRACAUDAL; PERINEURAL at 12:30

## 2022-11-07 RX ADMIN — ROCURONIUM BROMIDE 10 MG: 10 INJECTION INTRAVENOUS at 14:24

## 2022-11-07 RX ADMIN — PROPOFOL 120 MG: 10 INJECTION, EMULSION INTRAVENOUS at 13:24

## 2022-11-07 RX ADMIN — ROCURONIUM BROMIDE 40 MG: 10 INJECTION INTRAVENOUS at 13:24

## 2022-11-07 RX ADMIN — CEFAZOLIN 2000 MG: 2 INJECTION, POWDER, FOR SOLUTION INTRAMUSCULAR; INTRAVENOUS at 13:29

## 2022-11-07 RX ADMIN — SODIUM CHLORIDE, POTASSIUM CHLORIDE, SODIUM LACTATE AND CALCIUM CHLORIDE: 600; 310; 30; 20 INJECTION, SOLUTION INTRAVENOUS at 14:56

## 2022-11-07 RX ADMIN — PHENYLEPHRINE HYDROCHLORIDE 50 MCG: 10 INJECTION, SOLUTION INTRAMUSCULAR; INTRAVENOUS; SUBCUTANEOUS at 14:15

## 2022-11-07 RX ADMIN — GLYCOPYRROLATE 0.1 MG: 0.2 INJECTION INTRAMUSCULAR; INTRAVENOUS at 14:15

## 2022-11-07 RX ADMIN — FENTANYL CITRATE 25 MCG: 50 INJECTION, SOLUTION INTRAMUSCULAR; INTRAVENOUS at 13:49

## 2022-11-07 RX ADMIN — FENTANYL CITRATE 25 MCG: 50 INJECTION, SOLUTION INTRAMUSCULAR; INTRAVENOUS at 13:21

## 2022-11-07 RX ADMIN — BUPIVACAINE 20 ML: 13.3 INJECTION, SUSPENSION, LIPOSOMAL INFILTRATION at 12:30

## 2022-11-07 RX ADMIN — FENTANYL CITRATE 50 MCG: 50 INJECTION, SOLUTION INTRAMUSCULAR; INTRAVENOUS at 15:12

## 2022-11-07 RX ADMIN — Medication 20 MG: at 13:44

## 2022-11-07 RX ADMIN — MIDAZOLAM HYDROCHLORIDE 2 MG: 2 INJECTION, SOLUTION INTRAMUSCULAR; INTRAVENOUS at 13:10

## 2022-11-07 ASSESSMENT — PAIN SCALES - GENERAL
PAINLEVEL_OUTOF10: 0

## 2022-11-07 ASSESSMENT — PAIN - FUNCTIONAL ASSESSMENT: PAIN_FUNCTIONAL_ASSESSMENT: 0-10

## 2022-11-07 ASSESSMENT — PAIN DESCRIPTION - DESCRIPTORS: DESCRIPTORS: ACHING;THROBBING

## 2022-11-07 NOTE — ANESTHESIA PRE PROCEDURE
Department of Anesthesiology  Preprocedure Note       Name:  Sherine Rhodes   Age:  34 y.o.  :  1990                                          MRN:  [id number]         Date:  2022      Surgeon: Gregorio Porter):  Edi Sky MD    Procedure: Procedure(s):  RIGHT SHOULDER ARTHROSCOPIC BICEPS TENODESIS    Medications prior to admission:   Prior to Admission medications    Medication Sig Start Date End Date Taking? Authorizing Provider   valACYclovir (VALTREX) 500 MG tablet  10/6/22   Historical Provider, MD   sertraline (ZOLOFT) 100 MG tablet Take 100 mg by mouth nightly 22   Historical Provider, MD   baclofen (LIORESAL) 10 MG tablet  22   Historical Provider, MD   busPIRone (BUSPAR) 15 MG tablet  22   Historical Provider, MD   dicyclomine (BENTYL) 20 MG tablet Take 20 mg by mouth 22   Historical Provider, MD   meloxicam (MOBIC) 15 MG tablet  22   Historical Provider, MD       Current medications:    Current Facility-Administered Medications   Medication Dose Route Frequency Provider Last Rate Last Admin    midazolam (VERSED) 2 MG/2ML injection             fentaNYL (SUBLIMAZE) 100 MCG/2ML injection             bupivacaine (PF) (MARCAINE) 0.5 % injection             ceFAZolin (ANCEF) 2,000 mg in sodium chloride 0.9 % 50 mL IVPB (mini-bag)  2,000 mg IntraVENous Once Edi Sky MD        lidocaine PF 1 % injection 1 mL  1 mL IntraDERmal Once PRN Gregg Fowler MD        lactated ringers infusion   IntraVENous Continuous Gregg Fowler  mL/hr at 22 1105 New Bag at 22 1105    sodium chloride flush 0.9 % injection 5-40 mL  5-40 mL IntraVENous 2 times per day Gregg Fowler MD        sodium chloride flush 0.9 % injection 5-40 mL  5-40 mL IntraVENous PRN Gregg Fowler MD        0.9 % sodium chloride infusion   IntraVENous PRN Gregg Fowler MD           Allergies:     Allergies   Allergen Reactions    Azithromycin Diarrhea and Nausea Only     Severe abdominal pain       Problem List:  There is no problem list on file for this patient. Past Medical History:        Diagnosis Date    Dental crown present     anterior tooth (upper) with a filling       Past Surgical History:        Procedure Laterality Date    CHOLECYSTECTOMY      SHOULDER SURGERY Right     labrum repair    TUBAL LIGATION         Social History:    Social History     Tobacco Use    Smoking status: Never    Smokeless tobacco: Never   Substance Use Topics    Alcohol use: Never                                Counseling given: Not Answered      Vital Signs (Current):   Vitals:    11/03/22 1431 11/07/22 1037 11/07/22 1052   BP:  110/83    Pulse:  70    Resp:  18    Temp:   97.9 °F (36.6 °C)   TempSrc:   Temporal   SpO2:  98%    Weight: 100 lb (45.4 kg) 100 lb (45.4 kg)    Height: 5' 2\" (1.575 m) 5' 2\" (1.575 m)                                               BP Readings from Last 3 Encounters:   11/07/22 110/83       NPO Status: Time of last liquid consumption: 2100                        Time of last solid consumption: 2000                        Date of last liquid consumption: 11/06/22                        Date of last solid food consumption: 11/06/22    BMI:   Wt Readings from Last 3 Encounters:   11/07/22 100 lb (45.4 kg)   07/14/22 101 lb (45.8 kg)   06/16/22 101 lb (45.8 kg)     Body mass index is 18.29 kg/m². CBC: No results found for: WBC, RBC, HGB, HCT, MCV, RDW, PLT    CMP: No results found for: NA, K, CL, CO2, BUN, CREATININE, GFRAA, AGRATIO, LABGLOM, GLUCOSE, GLU, PROT, CALCIUM, BILITOT, ALKPHOS, AST, ALT    POC Tests: No results for input(s): POCGLU, POCNA, POCK, POCCL, POCBUN, POCHEMO, POCHCT in the last 72 hours.     Coags: No results found for: PROTIME, INR, APTT    HCG (If Applicable):   Lab Results   Component Value Date    PREGTESTUR Negative 11/07/2022        ABGs: No results found for: PHART, PO2ART, EPP6ZTX, HRF2TCQ, BEART, [id number]     Type & Screen (If Applicable):  No results found for: LABABO, LABRH    Drug/Infectious Status (If Applicable):  No results found for: HIV, HEPCAB    COVID-19 Screening (If Applicable): No results found for: COVID19        Anesthesia Evaluation  Patient summary reviewed and Nursing notes reviewed no history of anesthetic complications:   Airway: Mallampati: I  TM distance: >3 FB   Neck ROM: full  Mouth opening: > = 3 FB   Dental: normal exam         Pulmonary:Negative Pulmonary ROS breath sounds clear to auscultation                             Cardiovascular:  Exercise tolerance: good (>4 METS),           Rhythm: regular  Rate: normal                    Neuro/Psych:   (+) depression/anxiety             GI/Hepatic/Renal:        (-) no morbid obesity       Endo/Other:                     Abdominal:             Vascular: Other Findings:           Anesthesia Plan      general and regional     ASA 2     (Interscalene nerve block PSR)  Induction: intravenous. MIPS: Prophylactic antiemetics administered. Anesthetic plan and risks discussed with patient. Plan discussed with CRNA.                     Chaim Marroquin DO   11/7/2022

## 2022-11-07 NOTE — BRIEF OP NOTE
Brief Postoperative Note      Patient: Elaine Wood  YOB: 1990  MRN: [id number]    Date of Procedure: 11/7/2022    Pre-Op Diagnosis: Biceps tendinitis on right [M75.21]    Post-Op Diagnosis: Same       Procedure(s):  RIGHT SHOULDER ARTHROSCOPIC BICEPS TENODESIS    Surgeon(s):  Steffany Dorantes MD    Assistant:  Surgical Assistant: Reg Morel  Fellow: Ying Chirinos DO    Anesthesia: General    Estimated Blood Loss (mL): Minimal    Complications: None    Specimens:   * No specimens in log *    Implants:  * No implants in log *      Drains: * No LDAs found *    Findings: see dictation    Electronically signed by Mimi Thakkar DO on 11/7/2022 at 3:04 PM

## 2022-11-07 NOTE — ANESTHESIA POSTPROCEDURE EVALUATION
Department of Anesthesiology  Postprocedure Note    Patient: Ainsley Avitia  MRN: [id number]  YOB: 1990  Date of evaluation: 11/7/2022      Procedure Summary     Date: 11/07/22 Room / Location: [facility] OR 09 / [facility]    Anesthesia Start: 0220 Anesthesia Stop: 4028    Procedure: RIGHT SHOULDER ARTHROSCOPIC OPEN  BICEPS TENODESIS LYSIS OF  ADHESIONS DIAGNOSTIC SCOPE REMOVAL OF RETAINED SUTURE EXTENSIVE DEBRIDEMENT (Right) Diagnosis:       Biceps tendinitis on right      (Biceps tendinitis on right [M75.21])    Surgeons: Nneka Awan MD Responsible Provider: Mariusz Isaac DO    Anesthesia Type: general, regional ASA Status: 2          Anesthesia Type: No value filed.     Ela Phase I: Ela Score: 8    Ela Phase II:        Anesthesia Post Evaluation    Patient location during evaluation: PACU  Patient participation: complete - patient participated  Level of consciousness: awake and alert  Airway patency: patent  Nausea & Vomiting: no nausea and no vomiting  Complications: no  Cardiovascular status: hemodynamically stable  Respiratory status: acceptable  Hydration status: euvolemic  Multimodal analgesia pain management approach

## 2022-11-07 NOTE — PROGRESS NOTES
Anesthesia here to do block. O2 placed. Start time:1215  Stop time:1224  Tolerated block well with sedation    See flow sheet for vital signs.

## 2022-11-07 NOTE — ANESTHESIA PROCEDURE NOTES
Peripheral Block    Patient location during procedure: pre-op  Reason for block: procedure for pain, post-op pain management and at surgeon's request  Start time: 11/7/2022 12:46 PM  Staffing  Performed: anesthesiologist   Anesthesiologist: Judith Espino DO  Preanesthetic Checklist  Completed: patient identified, IV checked, site marked, risks and benefits discussed, surgical/procedural consents, equipment checked, pre-op evaluation, timeout performed, anesthesia consent given, oxygen available, monitors applied/VS acknowledged, fire risk safety assessment completed and verbalized and blood product R/B/A discussed and consented  Peripheral Block   Patient position: supine  Prep: ChloraPrep  Patient monitoring: cardiac monitor, continuous pulse ox, continuous capnometry, frequent blood pressure checks, IV access, responsive to questions and oxygen  Block type: Brachial plexus  Interscalene  Laterality: right  Injection technique: single-shot  Guidance: ultrasound guided    Needle   Needle gauge: 22 G  Needle localization: ultrasound guidance and anatomical landmarks  Assessment   Injection assessment: negative aspiration for heme, no paresthesia on injection, local visualized surrounding nerve on ultrasound and no intravascular symptoms  Paresthesia pain: none  Slow fractionated injection: yes  Hemodynamics: stable  Real-time US image taken/store: yes  Outcomes: uncomplicated and patient tolerated procedure well    Additional Notes  Sterile prep. Timeout with SDS RN. 2 mg versed + 100 mcg fentanyl IV for pt comfort. 20 mL 0.5% Bupivacaine + 20 mL exparel injected in 5 mL increments following negative aspiration. Tip of needle in view at all times. No pain or paresthesias on injection. Pt tolerated the procedure well.

## 2022-11-07 NOTE — DISCHARGE INSTRUCTIONS
PATIENT INSTRUCTIONS FOLLOWING OUT-PATIENT   SHOULDER SURGERY    1. You have probably had a Scalene Block to your arm. Because this numbs the arm this is great for anesthesia since we didnt have to give you as much anesthetic agent during the case--hopefully allowing you to feel more normal sooner. The block should last around 12 hours or so. So, if you had surgery at [address] [place] [address] it may wear off as soon as 8PM. When the block wears off, it wears off suddenly and you could go from no pain to severe pain quickly. We recommend that you take pain medication (typically 1-2 Percocet) by 8PM even if you are feeling only mild pain. It is easier to maintain good pain relief than to try to catch up.     2. You may have to take the pain medicine every 4-8 hours for the first day or two. After this, you can wean off the medicine and just take it as needed. 3. All pain medications can make you constipated. Drink plenty of fluids and eat lots of fiber to combat this. If you go for more than 3 days without a bowel movement, try a laxative. We recommend Miralax, an over the counter laxative, and/or colace, an over the counter stool softener. 4. Getting comfortable for sleeping is difficult after this surgery. It gets easier after several days. Many patients sleep better in a reclining lounger like a Lay-Z-Boy or in bed with several pillows to help prop them up. 5. Ice packs or a commercial ice cuff/machine are very helpful to reduce pain and inflammation after the surgery. Since the bandage is so thick you can leave the ice bags on top until the shoulder gets cold. Dont ice bare skin however or you could get frostbite. 6. Anesthesia and the pain medication can cause nausea. If this is severe or leads to throwing up call our office at [phone] so we can call an anti-nausea medicine into your pharmacy. Have the pharmacy phone number handy--its on the prescription bottles.     7. You probably have your first post-op appointment already scheduled, along with your first physical therapy appointment. If not, call us at [phone] to schedule it. We like to see you 5-8 days after the operation. 8. Getting dressed. Huge shirts work well to cover the shoulder. We will show you how to put a shirt on at the first office visit so that you can then start wearing the shirt under the sling. 9. Have someone drive you to the first appointment. You will be in a big sling and still probably taking pain medication. That wouldnt look good if you got in an accident. 10. If we gave you arthroscopic snap shots of your repaired shoulder or elbow, please bring them with you to the first office visit so that we can review them together. 11. Keep the dressing dry. The bulky dressing can be removed after 3 days after which it is okay to shower. Until then, however, sponge baths should be done for hygeine. Do not remove the steri-strips. We typically have patients leave them on until they slowly curl up and fall off. The longer the steri-strips are on the prettier the wounds look. Leave the see-through mesh dressing beneath your bandages in place when you eventually remove your outer bandages, we will change it at your first post-operative appointment. 12. Any questions or problems contact us anytime at [phone]. GENERAL OR TWILIGHT ANESTHESIA    1. For a minimum of 24 hours:   No driving or operating heavy machinery   No alcohol, sleeping pills, or tranquilizers   No signing important legal documents  2. Limit your general activity and rest for 24 hours. Resume your normal activities as you begin to feel better. 3.  Eat lightly for your first meal.  Advance to regular diet as tolerated. 4.  Cough and deep breathe every hour while awake for the next 24 hours  5.   If you have any problems and are unable to contact your doctor, go to the nearest emergency department. NERVE BLOCK INSTRUCTIONS      1. Your affected limb will be numb until the block starts to wear off  2. Do not use the affected limb until the block wears off  3. Start taking pain medication before the block wears off--expect pain to increase over the next 12 hours. [address]    There are potential side effects of anesthesia or sedation you may experience for the first 24 hours. These side effects include:    Confusion or Memory loss, Dizziness, or Delayed Reaction Times   [x]A responsible person should be with you for the next 24 hours. Do not operate any vehicles (automobiles, bicycles, motorcycles) or power tools or machinery for 24 hours. Do not sign any legal documents or make any legal decisions for 24 hours. Do not drink alcohol for 24 hours or while taking narcotic pain medication. Nausea    [x]Start with light diet and progress to your normal diet as you feel like eating. However, if you experience nausea or repeated episodes of vomiting which persist beyond 12-24 hours, notify your physician. Once nausea has passed, remember to keep drinking fluids. Difficulty Passing Urine  [x]Drink extra amounts of fluid today. Notify your physician if you have not urinated within 8 hours after your procedure or you feel uncomfortable. Irritated Throat from a Breathing Tube  [x]Drink extra amounts of fluid today. Lozenges may help. Muscle Aches  [x]You may experience some generalized body aches as your muscles recover from medications used to relax them during surgery. These will gradually subside. MEDICATION INSTRUCTIONS:  [x]Prescription(S) x  3  e-scribed to [facility].  Use as directed. When taking pain medications, you may experience the side effect of dizziness or drowsiness. Do not drink alcohol or drive when taking these medications.   []Prescription(S) x          Called to Pharmacy Name and location:    [x]Give the list of your medications to your primary care physician on your next visit. Keep your med list updated and carry it with in case of emergencies. [x] Narcotic pain medications can cause the side effect of significant constipation. You may want to add a stool softener to your postoperative medication schedule or speak to your surgeon on how best to manage this side effect. NARCOTIC SAFETY:  Your pain medicine is only for you to take. Safely store your medicines. Store pills up high and out of reach of children and pets. Ensure safety caps are snapped tightly  Keep track of how many pills you have left    Unused medication can be disposed of by taking them to a drop-off box or take-back program that is authorized by the [facility]. Access to a site near you can be found on the [facility] website ([address] [facility] [email or web address]). If you have a CPAP machine, it is very important that you use it daily during all periods of sleep and daytime rest during your recovery at home. Surgery and Anesthesia place a significant amount of stress on your body. Using your CPAP will help keep you safe and lessen the negative effects of that stress. FOLLOW-UP RECOVERY CARE:  [x]Call the office at [phone] for follow-up appointment and problems    Watch for these possible complications, symptoms, or side effects of anesthesia. Call physician if they or any other problems occur:  Signs of INFECTION   > Fever over 101°     > Redness, swelling, hardness or warmth at the operative site   >Foul smelling or cloudy drainage at the operative site   Unrelieved PAIN  Unrelieved NAUSEA  Blood soaked dressing. (Some oozing may be normal)  Inability to urinate      Numb, pale, blue, cold or tingling extremity      Physician:  Dr. Ciro Rangel    The above instructions were reviewed with patient/significant other.   The following additional patient specific information was reviewed with the patient/significant other:  [x]Procedure/physician specific instructions  [x]Medication information sheet(S) including potential side effects  []Alyssas egress test  []Pain Ball management  []FAQ Catheter associated blood stream infections  []FAQ Surgical Site Infections  []Other-    I have read and understand the instructions given to me: ____________________________________________   (Patient/S.O. Signature)            Date/time 11/7/2022 3:26 PM         PACU:  [phone]   M-F 700 AM - 7 PM      SAME DAY SERVICES:  [phone] M-F 7AM-6PM        If you smoke STOP. We care about your health! PERIPHERAL NERVE BLOCK INSTRUCTIONS     Please remember while having a nerve block you are at an increased risk for [address]!! You were given a nerve block today from the anesthesiologist. Most nerve blocks last anywhere from 6-36 hours. You should start taking your pain medication before the block wears off or when you first begin feeling discomfort. It takes at least 30-60 minutes for a pain pill to take effect. Pain medications should be taken with food. Consider setting an alarm through the night to help manage your pain level so you do not wake up with too much pain. Pain medicines can cause more sedation and decrease your breathing so ONLY take as directed. If you have Sleep Apnea, you definitely need to use your C-Pap machine. What to expect after a nerve block:   Numbness, tingling- arm or leg feels heavy or asleep  Weakness or inability to move or control your arm or leg   Inability to feel temperature changes to your arm or leg  Usually the weakness wears off first, followed by the tingling or heaviness. You may notice more pain at this point and should start taking your pain meds.    If you had a shoulder block, you may experience:  Mild shortness of breath (may be relieved by sitting up in a chair or recliner)  Hoarse voice  Blurry vision  Unequal pupils  Drooping of your face (eye or lip) on the same side as the nerve block  Swelling at the injection site on the side of your neck    These side effects should resolve as the block wears off! IF you have severe or prolonged shortness of breath-  GO to the nearest Emergency Room! If you had a nerve block of your arm or leg:  Protect the arm or leg from extreme hot or cold temperatures  Protect the arm or leg from obstructing blood flow by frequent position changes- Make sure fingers/ toes stay pink and warm. Call surgeon with any changes  For leg block, get assistance walking until the block wears off, ie:  crutches, walker, support person   If you continue to feel the effects of the nerve block for longer than 72 hours- call [facility] at [phone] and ask to speak with the Anesthesiologist on call. Your Surgeon or Anesthesiologist gave you Exparel     Exparel (bupivicaine liposome injectable suspension) is a medication injected into the surgical incision  just before the end of the procedure by your surgeon to help control your pain after surgery. It can also be given as a nerve block by the anesthesiologist. This local anesthetic provides pain relief by numbing the tissue around the surgical site. Exparel releases pain medication over time lasting up to 5 days or 120 hours. Exparel may cause a temporary loss of sensation or the ability to move in the area where the medication was injected. Side effects of Exparel that may occur include nausea, vomiting or constipation. Call immediately if you experience numbness or tingling of the mouth or lips, lightheadedness or severe anxiety. Notify your physician if you experience these or any other possible side effects of the medication. Note: Other forms of bupivacaine should not be administered within 96 hours (4 days) following administration of Exparel. Please keep ID band in place for 96 hours (4 days).

## 2022-11-07 NOTE — PROGRESS NOTES
Ambulatory Surgery/Procedure Discharge Note    Vitals:    11/07/22 1651   BP: 118/82   Pulse: 60   Resp: 17   Temp: 98.9 °F (37.2 °C)   SpO2: 100%       In: 1650 [P.O.:200; I.V.:1450]  Out: -     Restroom use offered before discharge. Pain assessment:  none  Pain Level: 0    Pt and family states \"ready to go home\". Pt alert and oriented x4. IV removed. Denies N/V or pain. Right shoulder dressing-C,D,I. Arm sling in place. Voided prior to discharge. Discharge instructions given to pt and  with pt permission. Pt and  verbalized understanding of all instructions. Left with all belongings and discharge instructions. 3 prescriptions e-scribed to patient's preferred pharmacy. Patient discharged to home/self care. Patient discharged via wheel chair by transporter to waiting family.        11/7/2022 5:38 PM

## 2022-11-07 NOTE — H&P
H&P Update     A hard copy history and physical was reviewed and is to be scanned into the chart. Date of Surgery Update:  Michaeleen Bloch was seen and examined. There have been no significant clinical changes since the completion of the above History and Physical.  Patient identified by surgeon; surgical site was confirmed by patient and surgeon. ?  Signed By: Daniela Geller DO    ? November 7, 2022 11:39 AM    ?  ?

## 2022-11-07 NOTE — PROGRESS NOTES
Taking ice chips and tolerating well and now drinking gayathri mist and doing well.  updated per phone and advised to head in since he lives about an hour away.

## 2022-11-07 NOTE — PROGRESS NOTES
PACU Transfer to [facility]    Vitals:    11/07/22 1630   BP: 110/78   Pulse: 77   Resp: 17   Temp: 97.5 °F (36.4 °C)   SpO2: 98%         Intake/Output Summary (Last 24 hours) at 11/7/2022 1643  Last data filed at 11/7/2022 1630  Gross per 24 hour   Intake 1650 ml   Output --   Net 1650 ml       Pain assessment:    Pain Level: 0    Patient transferred to care of [facility] RN.    11/7/2022 4:43 PM

## 2022-11-07 NOTE — PROGRESS NOTES
From OR arousable but very drowsy, denies any pain at this time, dressing CDI with shoulder immobilizer in place, ice pack applied, ST, other VSS. Report from CRNA.     S/P RIGHT SHOULDER ARTHROSCOPIC OPEN  BICEPS TENODESIS LYSIS OF  ADHESIONS DIAGNOSTIC SCOPE REMOVAL OF RETAINED SUTURE EXTENSIVE DEBRIDEMENT

## 2022-11-08 NOTE — OP NOTE
[facility] [address], [address]                                OPERATIVE REPORT    PATIENT NAME: Margot Velarde                    :        1990  MED REC NO:   [id number]                          ROOM:  ACCOUNT NO:   [de-identified]                           ADMIT DATE: 2022  PROVIDER:     Kaushik Welch MD    DATE OF PROCEDURE:  2022    PREOPERATIVE DIAGNOSES:  Right shoulder failed SLAP repair and biceps  tendinopathy. POSTOPERATIVE DIAGNOSES:  Right shoulder failed SLAP repair and biceps  tendinopathy with posterior instability, posterior labrum tear,  chondrolysis, retained sutures, extensive subacromial bursitis, and  adhesions. PROCEDURES:  Right shoulder examination under anesthesia, diagnostic  arthroscopy, arthroscopic extensive debridement, arthroscopic removal of  retained sutures and suture anchors, arthroscopic debridement of labrum  and cartilage, arthroscopic release of subdeltoid and subacromial  adhesions, arthroscopic rotator cuff debridement, arthroscopic  debridement of biceps stump, complete arthroscopic subacromial  bursectomy, arthroscopic posterior labrum repair, and open subpectoral  biceps tenodesis. ANESTHESIA:  General anesthesia, scalene block. IV FLUIDS:  1200 mL of crystalloids. ESTIMATED BLOOD LOSS:  50 mL. COMPLICATIONS:  None. SURGEON:  Kaushik Welch MD    ASSISTANTS:  Neva Whiting DO and Parvin Rhodes DO    The availability of at least one of these two skilled first assistants  was critically important to execute this arthroscopic procedure. They  held the arthroscope during suture passing and knot tying and helped  with biceps tenodesis exposure. IMPLANTS:  Ar[employer] BioComposite SutureTak 2.4 mm x 4 and Ar[employer]rex  BioComposite tenodesis screw 4.75 mm diameter x1.     FINDINGS:  Examination under anesthesia revealed 3+ posterior drawer and  1+ inferior sulcus, 1+ anterior drawer. The shoulder appeared to be  almost dislocated well posteriorly. Diagnostic arthroscopy revealed a  chronic posterior labrum tear with peel back. There is evidence of  prior repair of the superior labrum. The anchors had dissolved, but  there was extensive suture debris. The humeral head cartilage had some  full-thickness cartilage loss along the posterior rim and also more  centrally but posterior from midline glenoid, did not have significant  bone loss; however, there was some mild degenerative change. There were  adhesions anteriorly, low-grade articular side partial thickness rotator  cuff tear, evidence for recurrent SLAP tear with biceps tendinopathy. X-rays of the subacromial space revealed dense subdeltoid subacromial  bursitis and arthrotomy for biceps tenodesis revealed extensive  tenosynovitis. We could manage her posterior instability, posterior  stabilization. We did a biceps tenodesis rather than do a revision SLAP  repair. There were no other obvious anomalous findings. We were able  to correct the gross posterior laxity. BRIEF HISTORY AND PRESENTING ILLNESS:  As follows: This is a  58-year-old woman with a history of prior surgery, had severe pain over  the back of her shoulder and periscapular region. MRI showed recurrent  SLAP tear. X-rays were unremarkable. We recommended revision to biceps  tenodesis as well as address any concurrent lesions. We did not  appreciate a whole lot gross instability which she was guarding quite a  bit. She understood the concurrent lesions would be addressed as  encountered. She was comfortable with this. She gave her informed  consent. She understood that her shoulder may never be quite normal,  but that there were risks such as bleeding, infection, anesthetic risks,  injury to nerve and blood vessels, stiffness, weakness, incomplete pain  relief, and need for further surgery.   Nevertheless, we felt pretty  comfortable that we will make a shoulder substantially less painful and  more functional.  She was scheduled electively after appropriate  preoperative medical clearance. DESCRIPTION OF PROCEDURE:  On the day of procedure, she was brought back  to the operating room, placed on the operating table. General  anesthesia was established. Preoperative antibiotics and interscalene  block were given in the holding area. Under anesthesia, exam was  carried out with the findings as above. The patient was positioned  using a [address] [place] [address] St chair position in a sitting position. All pressure  points were padded. The patient's right shoulder and arm were prepped  and draped for standard arthroscopic shoulder surgery. We used a [employer]  Spider arm carney to hold the arm in position. We ignored the previous  arthroscopic portals and made new ones as needed. We began diagnostic  arthroscopy. The arthroscope was introduced through a standard  posterolateral portal.  Systematic diagnostic arthroscopy was ensued  with findings as noted above. We established anterior portal over the  rotator interval.  We inserted our arthroscopic shaver across the metal  canula. We debrided some rotator interval scarring. We debrided some  free suture anchors. We later cut suture anchor attached to the  anterior labrum. This was done with upbiting basket and grasper. We  debrided some fraying of the superior labrum. We could see there was  quite a bit of tearing more peripherally posteriorly. We debrided some  articular surface fraying of the rotator cuff. We probed the biceps. We elected to proceed with tenotomy as the first step of tenodesis. We  percutaneously placed an #18-gauge spinal needle into the biceps tendon  and passed #1 PDS suture through the needle and retrieved the suture. More medial to that, we now used an upbiting basket. We tenotomized the  biceps tendon right at the supraglenoid tubercle.   We debrided the  biceps stump using a shaver. We used a switching stick technique to  bring the arthroscope anteriorly and view the posterior structures. This is when we identified the posterior labrum repair and the exposed  bone. It was quite detached. Tissue quality was still good. We used a  shaver over metal cannula to lightly debride the posterior labrum and  remove some of the frayed edges. We still had some healthy tissues that  needed to be repaired. We established percutaneous trajectory for our  BioComposite cork screw anchors. This was done with standard  percutaneous kit from Arthrex. We used 2.4 mm anchors. We placed a  metal cannula. We used a 7 mm plastic cannula over switching stick and  dilator. We started that the anchor at 7:30 o'clock. We used NetzVacation  spectrum with the left 45-degree attachment to penetrate the  capsulolabral tissue exiting right where the anchor had been place. We  passed the PDS suture and then used it to relay in retrograde fashion,  one limb of the FiberWire suture with the anchor back out to the  capsulolabral tissue. Both ends were then retrieved out the plastic  cannula. A standard arthroscopic knot-tying techniques were carried  out. We used a Revo knot followed by alternating half hitches. Five  half hitches were made in total, and we used a cutter to leave short  tails. We saw that there was an opportunity to go more posterior  inferior and really get the posterior band and inferior glenohumeral  ligaments, we placed anchors 06:30 and used the same technique. This  was followed by an anchor at 08:30, and then an anchor at 10 o'clock. The 10 o'clock anchor was different because it was right at the ridge of  exposed bone. We were able to use an angled aung to penetrate the  labrum directly and retrieve one suture all in one pass, simple stitch  was tied.   Again, we were not in the capsular side, we had now four  suture anchors, four stitches, and this recreated the labral bumper. We  did not undergo further posterior superior for fear of tightening the  shoulder over tightening. We irrigated copiously. We then redirected  the arthroscope through same posterior portal above the rotator cuff and  into the subacromial space. We established a lateral portal under  direct visualization and dilated with arthroscopic shaver and performed  a thorough bursectomy. We then placed the arthroscope laterally and  completed bursectomy posterior and posterolaterally using the shaver and  a cautery device. We resected extensive adhesions anteriorly in the  gutter as well. We saw the rotator cuff and it looked good. Type 1  acromion was left alone. We could see the suture from our biceps  tagging. We left everything as is and this completed our arthroscopic  work. We then made a 3 cm longitudinal incision just lateral to the  principal axillary crease overlying the pectoralis muscle-tendon  junction. The incision was carried out with a 15 blade through skin  only. We went to the deep fascia underneath the pectoralis with  Metzenbaum scissors. We brought the arm internally rotated. We placed  a Hohmann retractor to retract the conjoint muscles. We used a Hohmann  to retract the pectoralis tendon. We identified the biceps tendon and  delivered with a Joker elevator. We excised all the tenosynovitis. We identified the bicipital groove and marked it with cautery. We  drilled our guidewire and then reamed over the guidewire with a 5.0  reamer to accommodate 4.75 screw in diminutive tendon. We removed all  the tenosynovitis from the tendon. We placed #2 FiberLoop suture in a  locking grasping fashion starting at the muscle-tendon junction working  our way proximally. Five passes were made and the tendon proximal was  excised. We tied them all with both ends of the suture exiting the  tendon. We loaded our 4.75 BioComposite tenodesis screw and cannulated  and inserted.   A loop with #2 FiberWire suture was placed and inserted. This allowed us to control the tendon. So, we delivered the tendon into  the bone window with a [place] elevator and secured with the interference  screw until the interference screw was flush with anterior cortical  surface. It was stable on gentle testing. We tied a pair of sutures  within the interference screw and a pair of sutures within the tendon. This reinforced our repair in a pants-over-vest-type fashion. We left  the tail short. We irrigated. A little bit of bleeding from within the  bone, so we used a little bit of bone wax. We then closed the wound in  routinely. We used 3-0 Vicryl in interrupted fashion to close the  subcutaneous tissue. Routine skin closure with 4-0 Monocryl and Prineo  to close the skin as well as arthroscopic portals, anterolateral and  posterior as well as posterolateral percutaneous portal.  Sterile  compressive dressing was applied. The arm was placed in padded soft  brace. The patient was repositioned in supine position, promptly  awakened from anesthesia, having tolerated the procedure well and taken  from the operating room to the recovery room in satisfactory condition. PLAN:  The patient will be discharged on oral analgesics with  instructions to continue with outpatient physical therapy. Follow up in  the office in one week.         Ben Galdamez MD    D: 11/07/2022 15:14:12       T: 11/07/2022 23:12:23     [email or web address]  Job#: [id number]     Doc#: [id number]    CC:

## 2022-11-14 ENCOUNTER — TELEPHONE (OUTPATIENT)
Dept: ORTHOPEDIC SURGERY | Age: 32
End: 2022-11-14

## 2022-11-14 NOTE — TELEPHONE ENCOUNTER
General Question     Subject:  RETURNED PHONE CALL  Patient and /or Facility Request:  Bradly Ontiveros  Contact Number: [phone]    THE PT RETURNED THE PHONE CALL FROM DR [facility]

## 2022-11-17 ENCOUNTER — OFFICE VISIT (OUTPATIENT)
Dept: ORTHOPEDIC SURGERY | Age: 32
End: 2022-11-17

## 2022-11-17 VITALS — WEIGHT: 100 LBS | BODY MASS INDEX: 18.4 KG/M2 | HEIGHT: 62 IN

## 2022-11-17 DIAGNOSIS — Z98.890 S/P SHOULDER SURGERY: Primary | ICD-10-CM

## 2022-11-17 PROCEDURE — 99024 POSTOP FOLLOW-UP VISIT: CPT | Performed by: ORTHOPAEDIC SURGERY

## 2022-11-29 ENCOUNTER — OFFICE VISIT (OUTPATIENT)
Dept: ORTHOPEDIC SURGERY | Age: 32
End: 2022-11-29

## 2022-11-29 VITALS — HEIGHT: 62 IN | WEIGHT: 100 LBS | BODY MASS INDEX: 18.4 KG/M2

## 2022-11-29 DIAGNOSIS — Z98.890 STATUS POST LABRAL REPAIR OF SHOULDER: ICD-10-CM

## 2022-11-29 DIAGNOSIS — Z98.890 S/P SHOULDER SURGERY: Primary | ICD-10-CM

## 2022-11-29 PROCEDURE — 99024 POSTOP FOLLOW-UP VISIT: CPT | Performed by: PHYSICIAN ASSISTANT

## 2022-11-29 NOTE — PROGRESS NOTES
History of Present Illness:  Hawa Dong is a 34 y.o. female who presents for a post operative visit. The patient underwent a right shoulder arthroscopic posterior labrum repair, and open subpectoral biceps tenodesis on 11/7/2022. She is off of her pain medications. She did drive to our office today. Patient has been compliant with her restrictions and her sling usage return back to work. She is catherine to [facility] with [employer]  The patient deny fevers, chills, numbness, tingling, and shortness of breath. Medical History:  Patient's medications, allergies, past medical, surgical, social and family histories were reviewed and updated as appropriate. Review of Systems  A 14 point review of systems was completed by the patient is available in the media section of the scanned medical record and was reviewed on 11/29/2022. Vital Signs: There were no vitals filed for this visit. General/Appearance: Alert and oriented and in no apparent distress. Skin:  There are no skin lesions, cellulitis, or extreme edema. The patient has warm and well-perfused Bilateral upper extremities with brisk capillary refill.      right Shoulder Exam:    Inspection: right shoulder incision that is clean, dry and intact and well approximated. The Prineo dressing is still in place. Mild ecchymosis and swelling are present as can be expected. There is no erythema, drainage or other signs of infection    Palpation:  No crepitus to gentle motion    Active Range of Motion: Deferred    Passive Range of Motion: Forward elevation 120, external rotation of 5    Strength:  Deferred    Special Tests:  Deferred. Neurovascular: Sensation to light touch is intact, no motor deficits, palpable radial pulses 2+    Radiology:     Plain radiographs not obtained.           Assessment :  Ms. Hawa Dong is a 34 y.o. patient who underwent a right shoulder arthroscopic posterior labrum repair and open subpectoral biceps tenodesis on 11/7/2022      Impression:  Encounter Diagnoses   Name Primary? S/P shoulder surgery Yes    Status post labral repair of shoulder        Office Procedures:  No orders of the defined types were placed in this encounter. Treatment Plan:    Overall the patient is doing well. The pain is well-controlled. The abduction pillow was removed and the sling was readjusted. Patient was asked to discontinue the sling usage at home and wear it primarily when she is outdoors in big crowds. She may also resume driving a car at this time without a sling. We will proceeded to update her formal physical therapy orders. All of her questions were fully answered today. We would like to see her back in 3 weeks for follow-up visit. 10:19 AM    Clara Maldonado PA-C  Orthopaedic Sports Medicine Physician Assistant      This dictation was performed with a verbal recognition program [facility]) and it was checked for errors. It is possible that there are still dictated errors within this office note. If so, please bring any errors to my attention for an addendum. All efforts were made to ensure that this office note is accurate.

## 2022-11-29 NOTE — LETTER
Physical Therapy Rehabilitation Referral    Patient Name:  Berlinda Ganser      YOB: 1990    Diagnosis:    1. S/P shoulder surgery    2. Status post labral repair of shoulder        Precautions:     [x] Evaluate and Treat    Post Op Instructions:  [] Continuous passive motion (CPM)  [x] Elbow ROM  [x] Exercise in plane of scapula  [x]  Strengthening     [x] Pulley and instruction   [x] Home exercise program (copy to patient)   [x] Sling when arm at risk  [] Sling or brace at all times   [x] AAROM: Forward elevation to  140            [x] AAROM: External rotation  To  40    [] Isometric external rotator strengthening [] AAROM: internal rotation: up the back  [x] Isometric abductor strengthening  [] AAROM: Internal abduction   [] Isometric internal rotator strengthening [] AAROM: cross-body adduction             Stretching:     Strengthening:  [] Four quadrant (FE, ER, IR, CBA)  [] Rotator cuff (ER, IR, Abd)  [] Forward Elevation    [] External Rotators     [] External Rotation    [] Internal Rotators  [] Internal Rotation: up/back   [] Abductors     [] Internal Rotation: supine in abduction  [] Sleeper Stretch    [] Flexors  [] Cross-body abduction    [] Extensors  [x] Pendulum (FE, Abd/Add, cw/ccw)  [x] Scapular Stabilizers   [x] Wall-walking (FE, Abd)        [x] Shoulder shrugs     [x] Table slides (FE)                [x] Rhomboid pinch  [] Elbow (flex, ext, pron, sup)        [] Lat.  Pull downs     [] Medial epicondylitis program       [] Forward punch   [] Lateral epicondylitis program       [] Internal rotators     [] Progressive resistive exercises  [] Bench Press        [] Bench press plus  Activities:     [] Lateral pull-downs  [] Rowing     [] Progressive two-hand supine press  [] Stepper/Exercise bike   [] Biceps: curls/supination  [] Swimming  [] Water exercises    Modalities:     Return to Sport:  [x] Of Choice      [] Plyometrics  [] Ultrasound     [] Rhythmic stabilization  [] Iontophoresis    [] Core strengthening   [] Moist heat     [] Sports specific program:   [] Massage         [x] Cryotherapy      [] Electrical stimulation     [] Paraffin  [] Whirlpool  [] TENS    [x] Home exercise program (copy to patient). Perform exercises for:   15     minutes    3      times/day  [x] Supervised physical therapy  Frequency: []  1x week  [x] 2x week  [] 3x week  [] Other:   Duration: [] 2 weeks   [] 4 weeks  [x] 6 weeks  [] Other:     Additional Instructions:     Rancho Dubois MD, PhD

## 2022-12-01 ENCOUNTER — TELEPHONE (OUTPATIENT)
Dept: ORTHOPEDIC SURGERY | Age: 32
End: 2022-12-01

## 2022-12-01 NOTE — TELEPHONE ENCOUNTER
General Question     Subject: FMLA PAPER  Patient and /or Facility Request: Christa Cabrera  Contact Number: [phone]    PATIENT CALLED IN TO SEE IF HER DISABILITY/FMLA PAPERWORK CAN BE EMAIL TO HER. PLEASE CALL PATIENT BACK AT THE ABOVE NUMBER. ..

## 2022-12-20 ENCOUNTER — OFFICE VISIT (OUTPATIENT)
Dept: ORTHOPEDIC SURGERY | Age: 32
End: 2022-12-20

## 2022-12-20 VITALS — BODY MASS INDEX: 18.4 KG/M2 | WEIGHT: 100 LBS | HEIGHT: 62 IN

## 2022-12-20 DIAGNOSIS — M75.01 ADHESIVE CAPSULITIS OF RIGHT SHOULDER: ICD-10-CM

## 2022-12-20 DIAGNOSIS — Z98.890 STATUS POST LABRAL REPAIR OF SHOULDER: Primary | ICD-10-CM

## 2022-12-20 PROCEDURE — 99024 POSTOP FOLLOW-UP VISIT: CPT | Performed by: PHYSICIAN ASSISTANT

## 2022-12-20 RX ORDER — MELOXICAM 15 MG/1
15 TABLET ORAL DAILY
Qty: 30 TABLET | Refills: 3 | Status: SHIPPED | OUTPATIENT
Start: 2022-12-20 | End: 2023-01-19

## 2022-12-20 RX ORDER — DOXYCYCLINE HYCLATE 100 MG
100 TABLET ORAL 2 TIMES DAILY
Qty: 14 TABLET | Refills: 0 | Status: SHIPPED | OUTPATIENT
Start: 2022-12-20 | End: 2022-12-27

## 2022-12-20 NOTE — PROGRESS NOTES
History of Present Illness:  John Guillen is a 34 y.o. female who presents for a post operative visit. The patient underwent a right shoulder arthroscopic posterior labrum repair, and open subpectoral biceps tenodesis on 11/7/2022. She is catherine to [employer] with [employer]. The patient has been mild with her restrictions. The patient deny fevers, chills, numbness, tingling, and shortness of breath. Medical History:  Patient's medications, allergies, past medical, surgical, social and family histories were reviewed and updated as appropriate. No notes on file    Review of Systems  A 14 point review of systems was completed by the patient is available in the media section of the scanned medical record and was reviewed on 12/20/2022. Vital Signs: There were no vitals filed for this visit. General/Appearance: Alert and oriented and in no apparent distress. Skin:  There are no skin lesions, cellulitis, or extreme edema. The patient has warm and well-perfused Bilateral upper extremities with brisk capillary refill.      right Shoulder Exam:    Inspection: right shoulder incision that is clean, dry and intact and well approximated. Axillary incision does appear to be a bit erythematous with some swelling and inflamed. No active drainage noted. Palpation:  No crepitus to gentle motion    Active Range of Motion: Forward elevation to 110, ER of 10  Passive Range of Motion: Forward elevation of 120    Strength:  Deferred    Special Tests:  Deferred. Neurovascular: Sensation to light touch is intact, no motor deficits, palpable radial pulses 2+    Radiology:     Plain radiographs not obtained today         Assessment :  Ms. John Guillen is a 34 y.o. patient underwent a right shoulder arthroscopic posterior labrum repair, and open subpectoral biceps tenodesis on 11/7/2022. She is starting to become a bit stiff. Also has a stitch abscess in the axillary incision.       Impression:  Encounter Diagnoses   Name Primary? Status post labral repair of shoulder Yes    Adhesive capsulitis of right shoulder        Office Procedures:  No orders of the defined types were placed in this encounter. Treatment Plan:    Overall the patient is doing well. The pain is well-controlled. We recommend that she completely discontinue her sling altogether. We will have them continue to focus on stretches and flexibility and will add some biceps and triceps strengthening exercises. Therapy orders were updated today. The patient was asked to start using meloxicam.  A prescription was sent to her pharmacy today. Additionally we will call in antibiotics for her to take for the superficial stitch abscess in her axillary incision. All of her questions were fully answered today. We would like to see her back in 4 weeks for follow-up visit. 10:58 AM      Birdie Rodriguez PA-C  Orthopaedic Sports Medicine Physician Assistant      This dictation was performed with a verbal recognition program [facility]) and it was checked for errors. It is possible that there are still dictated errors within this office note. If so, please bring any errors to my attention for an addendum. All efforts were made to ensure that this office note is accurate.

## 2022-12-20 NOTE — LETTER
Physical Therapy Rehabilitation Referral    Patient Name:  Damari Ruvalcaba      YOB: 1990    Diagnosis:    1. Status post labral repair of shoulder    2. Adhesive capsulitis of right shoulder    36. Post op shoulder stiffnesss    Precautions:     [x] Evaluate and Treat    Post Op Instructions:  [] Continuous passive motion (CPM) [] Elbow ROM  [x] Exercise in plane of scapula  []  Strengthening     [x] Pulley and instruction   [x] Home exercise program (copy to patient)   [] Sling when arm at risk  [] Sling or brace at all times   [x] AAROM: Forward elevation to  140            [x] AAROM: External rotation  To  40    [] Isometric external rotator strengthening [x] AAROM: internal rotation: up the back  [x] Isometric abductor strengthening  [x] AAROM: Internal abduction   [] Isometric internal rotator strengthening [x] AAROM: cross-body adduction             Stretching:     Strengthening:  [x] Four quadrant (FE, ER, IR, CBA)  [] Rotator cuff (ER, IR, Abd)  [x] Forward Elevation    [] External Rotators     [] External Rotation    [] Internal Rotators  [] Internal Rotation: up/back   [] Abductors     [] Internal Rotation: supine in abduction  [] Sleeper Stretch    [] Flexors  [] Cross-body abduction    [] Extensors  [x] Pendulum (FE, Abd/Add, cw/ccw)  [x] Scapular Stabilizers   [x] Wall-walking (FE, Abd)        [x] Shoulder shrugs     [x] Table slides (FE)                [x] Rhomboid pinch  [] Elbow (flex, ext, pron, sup)        [] Lat.  Pull downs     [] Medial epicondylitis program       [] Forward punch   [] Lateral epicondylitis program       [] Internal rotators     [] Progressive resistive exercises  [] Bench Press        [] Bench press plus  Activities:     [] Lateral pull-downs  [] Rowing     [x] Progressive two-hand supine press  [] Stepper/Exercise bike   [x] Biceps: curls/supination  [] Swimming  [] Water exercises    Modalities:     Return to Sport:  [x] Of Choice      [] Plyometrics  [] Ultrasound     [] Rhythmic stabilization  [] Iontophoresis    [] Core strengthening   [] Moist heat     [] Sports specific program:   [] Massage         [x] Cryotherapy      [] Electrical stimulation     [] Paraffin  [] Whirlpool  [] TENS    [x] Home exercise program (copy to patient). Perform exercises for:   15     minutes    3      times/day  [x] Supervised physical therapy  Frequency: []  1x week  [x] 2x week  [] 3x week  [] Other:   Duration: [] 2 weeks   [] 4 weeks  [x] 6 weeks  [] Other:     Additional Instructions:     Rancho Mooney MD, PhD

## 2023-01-19 ENCOUNTER — TELEPHONE (OUTPATIENT)
Dept: ORTHOPEDIC SURGERY | Age: 33
End: 2023-01-19

## 2023-01-19 ENCOUNTER — OFFICE VISIT (OUTPATIENT)
Dept: ORTHOPEDIC SURGERY | Age: 33
End: 2023-01-19

## 2023-01-19 VITALS — BODY MASS INDEX: 18.4 KG/M2 | HEIGHT: 62 IN | WEIGHT: 100 LBS

## 2023-01-19 DIAGNOSIS — Z98.890 STATUS POST LABRAL REPAIR OF SHOULDER: Primary | ICD-10-CM

## 2023-01-19 PROCEDURE — 99024 POSTOP FOLLOW-UP VISIT: CPT | Performed by: ORTHOPAEDIC SURGERY

## 2023-01-19 NOTE — PROGRESS NOTES
Chief Complaint    Post-Op Check (Right Shoulder)      History of Present Illness:  Zuly Mcclellan is a pleasant, 34 y.o., female, here today for follow up of her right shoulder. The patient underwent a right shoulder arthroscopic posterior labrum repair, and open subpectoral biceps tenodesis on 11/7/2022 - she is now 10 weeks postop. She has continued in physical therapy at [facility] in [place]. She is very pleased with her shoulder and the recovery up to this point. Her pain levels are minimal. She reports no new injuries or setbacks. Medical History:  Patient's medications, allergies, past medical, surgical, social and family histories were reviewed and updated as appropriate. No notes on file    Review of Systems  A 14 point review of systems was completed by the patient and is available in the media section of the scanned medical record and was reviewed on 1/19/2023. The review is negative with the exception of those things mentioned in the HPI and Past Medical History    Vital Signs: There were no vitals filed for this visit. General/Appearance: Alert and oriented and in no apparent distress. Skin:  There are no skin lesions, cellulitis, or extreme edema. The patient has warm and well-perfused Bilateral upper extremities with brisk capillary refill. Right Shoulder Exam:  Inspection: Incision and portals are healing well. No gross deformities, no signs of infection. Palpation: Non-tender to palpate    Active Range of Motion: Forward Elevation 160, Abduction 160, External Rotation 50 vs 60, Internal Rotation T11 vs T7    Passive Range of Motion: Deferred    Strength:  External Rotation 4/5, Internal Rotation 4+/5, Champagne Toast 4/5    Special Tests: No Chava muscle deformity. Neurovascular: Sensation to light touch is intact, no motor deficits, palpable radial pulses 2+      Radiology:     No new XR obtained at this time.          Assessment :  Ms. Zuly Mcclellan is a dion, 34 y.o. patient who underwent a right shoulder arthroscopic posterior labrum repair, and open subpectoral biceps tenodesis on 11/7/2022 - she is now 10 weeks postop. Impression:  Encounter Diagnosis   Name Primary? Status post labral repair of shoulder Yes       Office Procedures:  Orders Placed This Encounter   Procedures    Amb External Referral To Physical Therapy     Referral Priority:   Routine     Referral Type:   Consult for Advice and Opinion     Referral Reason:   Patient Preference     Requested Specialty:   Orthopedic Physical Therapist     Number of Visits Requested:   1       Treatment Plan:  Janie Ochoa  is doing very well in her recovery. We recommend this patient continue in physical therapy. A new physical therapy letter was documented in [facility] today. We will ramp up strengthening phase of rehab. We would like to see her back in 6 weeks to re-check her strength and we will plan to release her at that time. We will see Radha Moseley back in 6 weeks and/or as needed. All questions were answered to patient's satisfaction and She was encouraged to call with any further questions or concerns. Aria Ward is in agreement with this plan. 1/19/2023  9:24 AM    Samira Ventura ATC  [employer] [address] [facility]    During this examination, I, Maile Garrett, functioned as a scribe for Dr. Susan Jurado. The history taking and physical examination were performed by Dr. Alberto Olivas. All counseling during the appointment was performed between the patient and Dr. Alberto Olivas. 1/19/23  ______________  I, Dr. Susan Jurado, personally performed the services described in this documentation as described by Samira Ventura ATC in my presence, and it is both accurate and complete. Rancho Olivas MD, PhD  1/19/2023

## 2023-01-19 NOTE — LETTER
Physical Therapy Rehabilitation Referral    Patient Name:  Garry Herrera      YOB: 1990    Diagnosis:    1. Status post labral repair of shoulder    36. Post op shoulder stiffnesss    Precautions:     [x] Evaluate and Treat    Post Op Instructions:  [] Continuous passive motion (CPM) [x] Elbow ROM  [x] Exercise in plane of scapula  []  Strengthening     [x] Pulley and instruction   [x] Home exercise program (copy to patient)   [] Sling when arm at risk  [] Sling or brace at all times   [x] AAROM: Forward elevation to  140+            [x] AAROM: External rotation  To  40 +   [x] Isometric external rotator strengthening [x] AAROM: internal rotation: up the back  [x] Isometric abductor strengthening  [x] AAROM: Internal abduction   [x] Isometric internal rotator strengthening [x] AAROM: cross-body adduction             Stretching:     Strengthening:  [x] Four quadrant (FE, ER, IR, CBA)  [x] Rotator cuff (ER, IR, Abd)  [x] Forward Elevation    [] External Rotators     [] External Rotation    [] Internal Rotators  [] Internal Rotation: up/back   [] Abductors     [] Internal Rotation: supine in abduction  [] Sleeper Stretch    [] Flexors  [] Cross-body abduction    [] Extensors  [x] Pendulum (FE, Abd/Add, cw/ccw)  [x] Scapular Stabilizers   [x] Wall-walking (FE, Abd)        [x] Shoulder shrugs     [x] Table slides (FE)                [x] Rhomboid pinch  [] Elbow (flex, ext, pron, sup)        [] Lat.  Pull downs     [] Medial epicondylitis program       [] Forward punch   [] Lateral epicondylitis program       [] Internal rotators     [] Progressive resistive exercises  [] Bench Press        [] Bench press plus  Activities:     [] Lateral pull-downs  [x] Rowing     [x] Progressive two-hand supine press  [] Stepper/Exercise bike   [x] Biceps: curls/supination  [] Swimming  [] Water exercises    Modalities:     Return to Sport:  [x] Of Choice      [] Plyometrics  [] Ultrasound     [] Rhythmic stabilization  [] Iontophoresis    [] Core strengthening   [] Moist heat     [] Sports specific program:   [] Massage         [x] Cryotherapy      [] Electrical stimulation     [] Paraffin  [] Whirlpool  [] TENS    [x] Home exercise program (copy to patient). Perform exercises for:   15     minutes    3      times/day  [x] Supervised physical therapy  Frequency: []  1x week  [x] 2x week  [] 3x week  [] Other:   Duration: [] 2 weeks   [] 4 weeks  [x] 6 weeks  [] Other:     Additional Instructions:     Rancho Mejias MD, PhD

## 2023-01-20 NOTE — PROGRESS NOTES
History of Present Illness:  Stacie Gomez is a pleasant 34 y.o. female who presents for a post operative visit. She is 10 days out following a right shoulder arthroscopic debridement, posterior labrum repair with open biceps tenodesis on 11/7/22. Overall She is doing okay and feels that their pain is well controlled with current pain medications. She has been compliant with wearing the UltraSling brace at all times. She plans to do physical therapy at [facility]. She denies fevers, chills, numbness, tingling, and shortness of breath. Medical History:  Patient's medications, allergies, past medical, surgical, social and family histories were reviewed and updated as appropriate. No notes on file    Review of Systems  A 14 point review of systems was completed by the patient and is available in the media section of the scanned medical record and was reviewed on 11/17/2022. Vital Signs: There were no vitals filed for this visit. General/Appearance: Alert and oriented and in no apparent distress. Skin:  There are no skin lesions, cellulitis, or extreme edema. The patient has warm and well-perfused Bilateral upper extremities with brisk capillary refill. Right Shoulder Exam:    Inspection: Shoulder incision and portals are clean, dry and intact and well approximated. The Prineo dressing is still in place. Mild ecchymosis and swelling are present as can be expected. There is no erythema, drainage or other signs of infection    Palpation:  No crepitus to gentle motion    Active Range of Motion: Deferred    Passive Range of Motion:  Tolerates gentle passive motion well    Strength:  Deferred    Special Tests:  Deferred. Neurovascular: Sensation to light touch is intact, no motor deficits, palpable radial pulses 2+    Radiology:     No new XR obtained at this time.          Assessment :  Ms. Stacie Gomez is a pleasant 34 y.o. patient who is 10 days out following a right shoulder arthroscopic debridement, posterior labrum repair with open biceps tenodesis on 11/7/22. Impression:  Encounter Diagnosis   Name Primary? S/P shoulder surgery Yes       Office Procedures:  Orders Placed This Encounter   Procedures    Amb External Referral To Physical Therapy     Referral Priority:   Routine     Referral Type:   Consult for Advice and Opinion     Referral Reason:   Patient Preference     Requested Specialty:   Orthopedic Physical Therapist     Number of Visits Requested:   1       Treatment Plan:    Overall Riri Gibbons is doing well. The pain is well-controlled. We recommend that She wear the UltraSling brace at all times with the exception of clothing, bathing and physical therapy. The patient was told that she is restricted from driving for at least 3 weeks postop. Physical therapy will increase incrementally. All of her questions were fully answered today. We would like to see Riri Gibbons back in 2 weeks for follow-up visit. Riri Gibbons is in agreement with this plan. 11/17/2022  1:22 PM    Fern Alonzo ATC  [facility] [address] [facility]    During this examination, IGini, functioned as a scribe for Dr. Errol Neumann. The history taking and physical examination were performed by Dr. Tia Berumen. All counseling during the appointment was performed between the patient and Dr. Tia Berumen. 11/17/2022    ______________________    I, Dr. Errol Neumann, personally performed the services described in this documentation as described by Fern Alonzo ATC in my presence, and it is both accurate and complete. Rancho Berumen MD, PhD  11/17/2022 [employer]

## 2023-03-02 ENCOUNTER — OFFICE VISIT (OUTPATIENT)
Dept: ORTHOPEDIC SURGERY | Age: 33
End: 2023-03-02

## 2023-03-02 VITALS — HEIGHT: 62 IN | WEIGHT: 100 LBS | BODY MASS INDEX: 18.4 KG/M2

## 2023-03-02 DIAGNOSIS — Z98.890 S/P SHOULDER SURGERY: ICD-10-CM

## 2023-03-02 DIAGNOSIS — Z98.890 STATUS POST LABRAL REPAIR OF SHOULDER: Primary | ICD-10-CM

## 2023-03-02 RX ORDER — SERTRALINE HYDROCHLORIDE 100 MG/1
150 TABLET, FILM COATED ORAL NIGHTLY
COMMUNITY
Start: 2023-02-09 | End: 2023-03-11

## 2023-03-02 NOTE — LETTER
Physical Therapy Rehabilitation Referral    Patient Name:  Dio Mojica      YOB: 1990    Diagnosis:    1. Status post labral repair of shoulder    2. S/P shoulder surgery        Precautions:     [x] Evaluate and Treat    Post Op Instructions:  [] Continuous passive motion (CPM) [] Elbow ROM  [x] Exercise in plane of scapula  []  Strengthening     [] Pulley and instruction   [x] Home exercise program (copy to patient)   [] Sling when arm at risk  [] Sling or brace at all times   [] AAROM: Forward elevation to  140            [] AAROM: External rotation  To  40    [] Isometric external rotator strengthening [] AAROM: internal rotation: up the back  [x] Isometric abductor strengthening  [] AAROM: Internal abduction   [] Isometric internal rotator strengthening [] AAROM: cross-body adduction             Stretching:     Strengthening:  [] Four quadrant (FE, ER, IR, CBA)  [x] Rotator cuff (ER, IR, Abd)  [] Forward Elevation    [x] External Rotators     [] External Rotation    [x] Internal Rotators  [x] Internal Rotation: up/back   [x] Abductors     [x] Internal Rotation: supine in abduction  [x] Sleeper Stretch    [x] Flexors  [] Cross-body abduction    [x] Extensors  [] Pendulum (FE, Abd/Add, cw/ccw)  [x] Scapular Stabilizers   [] Wall-walking (FE, Abd)        [x] Shoulder shrugs     [] Table slides (FE)                [x] Rhomboid pinch  [] Elbow (flex, ext, pron, sup)        [x] Lat.  Pull downs     [] Medial epicondylitis program       [x] Forward punch   [] Lateral epicondylitis program       [x] Internal rotators     [] Progressive resistive exercises  [x] Bench Press        [x] Bench press plus  Activities:     [x] Lateral pull-downs  [] Rowing     [x] Progressive two-hand supine press  [] Stepper/Exercise bike   [x] Biceps: curls/supination  [] Swimming  [] Water exercises    Modalities:     Return to Sport:  [x] Of Choice      [x] Plyometrics  [] Ultrasound     [x] Rhythmic stabilization  [] Iontophoresis    [x] Core strengthening   [] Moist heat     [x] Sports specific program: Throwing 10        program  [] Massage         [x] Cryotherapy      [] Electrical stimulation     [] Paraffin  [] Whirlpool  [] TENS    [x] Home exercise program (copy to patient). Perform exercises for:   15     minutes    3      times/day  [x] Supervised physical therapy  Frequency: []  1x week  [x] 2x week  [] 3x week  [] Other:   Duration: [] 2 weeks   [] 4 weeks  [x] 6 weeks  [] Other:     Additional Instructions:     Rancho Scott MD, PhD

## 2023-03-03 NOTE — PROGRESS NOTES
History of Present Illness:  Richard Botello is a 34 y.o. female who presents for a post operative visit. The patient underwent a right shoulder arthroscopic posterior labral repair with open subpectoral biceps tenodesis on 11/7/2022. Patient is currently 4 months postop. The patient had been going to [facility] in [place]. Patient reports she is an extremely active individual and feels that she needs further physical therapy to really get back to her full potential.  Patient reports that her physical therapist feels that she has completed her therapy and can gradually ramp up on her own. Patient would like to discuss this today. 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  Patient does feel that she has improved significantly and has no instability with the shoulder. The patient deny fevers, chills, numbness, tingling, and shortness of breath. Medical History:  Patient's medications, allergies, past medical, surgical, social and family histories were reviewed and updated as appropriate. No notes on file    Review of Systems  A 14 point review of systems was completed by the patient is available in the media section of the scanned medical record and was reviewed on 3/3/2023. Vital Signs: There were no vitals filed for this visit. General/Appearance: Alert and oriented and in no apparent distress. Skin:  There are no skin lesions, cellulitis, or extreme edema. The patient has warm and well-perfused Bilateral upper extremities with brisk capillary refill. Right shoulder Exam:    Inspection: Right shoulder incision is fully healed. There is no erythema, drainage or other signs of infection    Palpation:  No crepitus to gentle motion    Active Range of Motion:  Forward elevation of 160 degrees abduction of 160 degrees, external rotation of 50 and internal rotation of T10 versus T7. Passive Range of Motion: Deferred    Strength: +4/5 external/internal rotation with resistance, 4/5 champagne toast test    Special Tests: No Chava muscle deformity, negative apprehension. Neurovascular: Sensation to light touch is intact, no motor deficits, palpable radial pulses 2+    Radiology:     Plain radiographs not obtained today. Assessment :  Ms. Silvia Morrow is a 34 y.o. patient who underwent a right arthroscopic posterior labral repair with open subpectoral biceps tenodesis on 11/7/2022. She has done really well in her recovery but recommend that she continue to focus on strength program and therapy as well as the throwing 10 program.      Impression:  Encounter Diagnoses   Name Primary? Status post labral repair of shoulder Yes    S/P shoulder surgery        Office Procedures:  No orders of the defined types were placed in this encounter. Treatment Plan:    Overall the patient is doing well. The pain is well-controlled. We agree with the patient that she would benefit with further formal physical therapy. 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 Patient was also asked to continue to ramp up on strength and endurance as tolerated with therapy. Her orders were updated and printout was handed to the patient today. All of her questions were fully answered today. We would like to see her back in 6 weeks for follow-up visit. 11:37 AM    Dinora Espino PA-C  Orthopaedic Sports Medicine Physician Assistant       This dictation was performed with a verbal recognition program [facility]) and it was checked for errors. It is possible that there are still dictated errors within this office note. If so, please bring any errors to my attention for an addendum. All efforts were made to ensure that this office note is accurate.

## (undated) DEVICE — UNDERGLOVE SURG SZ 8 BLU LTX FREE SYN POLYISOPRENE POLYMER

## (undated) DEVICE — MAT FLR W32XL58IN

## (undated) DEVICE — SUTURE MCRYL SZ 4-0 L27IN ABSRB UD L19MM PS-2 1/2 CIR PRIM Y426H

## (undated) DEVICE — SUTURE VCRL SZ 3-0 L27IN ABSRB UD L26MM CT-2 1/2 CIR J232H

## (undated) DEVICE — SOLUTION IV IRRIG LACTATED RINGERS 3000ML 2B7487

## (undated) DEVICE — TUBING PMP L16FT MAIN DISP FOR AR-6400 AR-6475

## (undated) DEVICE — TOWEL,STOP FLAG GOLD N-W: Brand: MEDLINE

## (undated) DEVICE — GOWN,SIRUS,POLYRNF,BRTHSLV,XL,30/CS: Brand: MEDLINE

## (undated) DEVICE — PUDDLEVAC FLOOR SUCTION DEVICE: Brand: PUDDLEVAC

## (undated) DEVICE — 3M™ IOBAN™ 2 ANTIMICROBIAL INCISE DRAPE 6640EZ: Brand: IOBAN™ 2

## (undated) DEVICE — TUBING PMP L6FT CONT WAVE EXTN

## (undated) DEVICE — TUBING FLD MGMT Y DBL SPIK DUALWAVE

## (undated) DEVICE — BUR SHAVER 5 MMX13 CM 8 FLUT OVL FOR AGGRESSIVE BNE COOLCUT

## (undated) DEVICE — SUTURE PDS II SZ 1 L27IN ABSRB VLT CT-1 L36MM 1/2 CIR Z341H

## (undated) DEVICE — CANNULA ARTHSCP L7CM DIA7MM TRNSLUC THRD FLX W/ NO SQUIRT

## (undated) DEVICE — BLADE SHV L13CM DIA5MM EXCALIBUR AGG COOLCUT

## (undated) DEVICE — GOWN,REINFRCE,POLY,ECLIPSE,SLV,3XLG: Brand: MEDLINE

## (undated) DEVICE — SPONGE GZ W4XL8IN COT WVN 12 PLY

## (undated) DEVICE — PROBE ABLAT XL 90DEG ASPIR BPLR RF 1 PC ELECTRD ERGO HNDL

## (undated) DEVICE — KIT INSRT DIA2.4MM PERC INCL DISP 17GA SPNL NDL 1.1MM NIT

## (undated) DEVICE — GLOVE ORANGE PI 8   MSG9080

## (undated) DEVICE — SHOULDER ARTHROSCOPY: Brand: MEDLINE INDUSTRIES, INC.

## (undated) DEVICE — SUTURE FIBERWIRE SZ 2 W/ TAPERED NEEDLE BLUE L38IN NONABSORB BLU L26.5MM 1/2 CIRCLE AR7200

## (undated) DEVICE — SYSTEM SKIN CLSR 22CM DERMBND PRINEO